# Patient Record
Sex: FEMALE | Race: WHITE | NOT HISPANIC OR LATINO | URBAN - METROPOLITAN AREA
[De-identification: names, ages, dates, MRNs, and addresses within clinical notes are randomized per-mention and may not be internally consistent; named-entity substitution may affect disease eponyms.]

---

## 2019-02-02 ENCOUNTER — EMERGENCY (EMERGENCY)
Facility: HOSPITAL | Age: 59
LOS: 1 days | Discharge: HOME | End: 2019-02-02
Attending: EMERGENCY MEDICINE
Payer: COMMERCIAL

## 2019-02-02 VITALS
TEMPERATURE: 98 F | DIASTOLIC BLOOD PRESSURE: 101 MMHG | HEIGHT: 60 IN | RESPIRATION RATE: 16 BRPM | OXYGEN SATURATION: 98 % | HEART RATE: 81 BPM | WEIGHT: 139.99 LBS | SYSTOLIC BLOOD PRESSURE: 165 MMHG

## 2019-02-02 VITALS
HEART RATE: 88 BPM | OXYGEN SATURATION: 99 % | SYSTOLIC BLOOD PRESSURE: 133 MMHG | DIASTOLIC BLOOD PRESSURE: 86 MMHG | TEMPERATURE: 98 F | RESPIRATION RATE: 18 BRPM

## 2019-02-02 LAB
ALBUMIN SERPL ELPH-MCNC: 4.4 G/DL — SIGNIFICANT CHANGE UP (ref 3.3–5)
ALP SERPL-CCNC: 87 U/L — SIGNIFICANT CHANGE UP (ref 40–120)
ALT FLD-CCNC: 18 U/L — SIGNIFICANT CHANGE UP (ref 10–45)
ANION GAP SERPL CALC-SCNC: 12 MMOL/L — SIGNIFICANT CHANGE UP (ref 5–17)
AST SERPL-CCNC: 15 U/L — SIGNIFICANT CHANGE UP (ref 10–40)
BILIRUB SERPL-MCNC: 0.2 MG/DL — SIGNIFICANT CHANGE UP (ref 0.2–1.2)
BUN SERPL-MCNC: 21 MG/DL — SIGNIFICANT CHANGE UP (ref 7–23)
CALCIUM SERPL-MCNC: 9.1 MG/DL — SIGNIFICANT CHANGE UP (ref 8.4–10.5)
CHLORIDE SERPL-SCNC: 103 MMOL/L — SIGNIFICANT CHANGE UP (ref 96–108)
CK SERPL-CCNC: 92 U/L — SIGNIFICANT CHANGE UP (ref 25–170)
CO2 SERPL-SCNC: 25 MMOL/L — SIGNIFICANT CHANGE UP (ref 22–31)
CREAT SERPL-MCNC: 0.74 MG/DL — SIGNIFICANT CHANGE UP (ref 0.5–1.3)
GLUCOSE SERPL-MCNC: 105 MG/DL — HIGH (ref 70–99)
MAGNESIUM SERPL-MCNC: 2.1 MG/DL — SIGNIFICANT CHANGE UP (ref 1.6–2.6)
POTASSIUM SERPL-MCNC: 4 MMOL/L — SIGNIFICANT CHANGE UP (ref 3.5–5.3)
POTASSIUM SERPL-SCNC: 4 MMOL/L — SIGNIFICANT CHANGE UP (ref 3.5–5.3)
PROT SERPL-MCNC: 7.7 G/DL — SIGNIFICANT CHANGE UP (ref 6–8.3)
SODIUM SERPL-SCNC: 140 MMOL/L — SIGNIFICANT CHANGE UP (ref 135–145)

## 2019-02-02 PROCEDURE — 80053 COMPREHEN METABOLIC PANEL: CPT

## 2019-02-02 PROCEDURE — 83735 ASSAY OF MAGNESIUM: CPT

## 2019-02-02 PROCEDURE — 99283 EMERGENCY DEPT VISIT LOW MDM: CPT

## 2019-02-02 PROCEDURE — 82550 ASSAY OF CK (CPK): CPT

## 2019-02-02 PROCEDURE — 99284 EMERGENCY DEPT VISIT MOD MDM: CPT

## 2019-02-02 RX ORDER — ACETAMINOPHEN 500 MG
975 TABLET ORAL ONCE
Qty: 0 | Refills: 0 | Status: COMPLETED | OUTPATIENT
Start: 2019-02-02 | End: 2019-02-02

## 2019-02-02 RX ORDER — IBUPROFEN 200 MG
400 TABLET ORAL ONCE
Qty: 0 | Refills: 0 | Status: COMPLETED | OUTPATIENT
Start: 2019-02-02 | End: 2019-02-02

## 2019-02-02 RX ADMIN — Medication 400 MILLIGRAM(S): at 16:37

## 2019-02-02 RX ADMIN — Medication 975 MILLIGRAM(S): at 16:35

## 2019-02-02 RX ADMIN — Medication 975 MILLIGRAM(S): at 16:37

## 2019-02-02 NOTE — ED PROVIDER NOTE - NSFOLLOWUPINSTRUCTIONS_ED_ALL_ED_FT
Keep continue your current medications.   Motrin (Advil or Ibuprofen) 400mg every 8hours for pain with food.  Tylenol 500mg or 650mg every 6hours for pain as needed.  Follow up with your Dr. for reevaluation in 2-3days, call Monday for an appointment.  Return for any concerns or worsening symptoms.

## 2019-02-02 NOTE — ED PROVIDER NOTE - OBJECTIVE STATEMENT
57yo female pt with PMHx of HTN, Chronic low back pain S/P Lumbar fusion (2016) c/o B/L thigh pain since 5-6days ago. Pt stated the pain's gradually worsen with ambulation. Denies injury or heavy lifting. Pt also reported the back pain is usually mild/ intermittent which improved with Tylenol but she took Tylenol for thigh pain without relief. Denies fever, chills or recent cough/ congestion. Denies headache, neck or back pain. Denies 59yo female pt with PMHx of HTN, Chronic low back pain S/P Lumbar fusion (2016) c/o B/L thigh pain since 5-6days ago. Pt stated the pain's gradually worsen with ambulation. Denies injury or heavy lifting. Pt also reported the back pain is usually mild/ intermittent which improved with Tylenol but she took Tylenol for thigh pain without relief. Denies fever, chills or recent cough/ congestion. Denies headache, neck or back pain. Denies sensory changes or weakness to extremities. Denies CP/SOB/ABD pain. Denies urinary or bowel problems. 59yo female pt with PMHx of HTN, Chronic low back pain S/P Lumbar fusion (2016) c/o B/L anterior-only thigh pain since 5-6days ago. The patient feels this is unrelated to her usual back pain. Thigh pain is crampy and acnhy, not burning. Pt stated the pain's gradually worsen with ambulation. Denies injury or heavy lifting. Pt also reported the back pain is usually mild/ intermittent which improved with Tylenol but she took Tylenol for thigh pain without relief. Denies fever, chills or recent cough/ congestion. Denies headache, neck or back pain. Denies sensory changes or weakness to extremities. Denies CP/SOB/ABD pain. Denies urinary or bowel problems. Last tylenol this AM.

## 2019-02-02 NOTE — ED PROVIDER NOTE - PHYSICAL EXAMINATION
NAD, Hypertensive, Afebrile. No spinal tenderness. No CVA or sciatica tender. ABD soft, non tender. No pelvic or hip tender. No obvious swelling, lesions or tender to B/L thigh. Full ROMs of knee without tenderness. No calf tender. N/V- intact. NAD, Hypertensive, Afebrile. No spinal tenderness. No CVA or sciatica tender. ABD soft, non tender. No pelvic or hip tender. No obvious swelling, lesions, warmth, or tender to B/L thigh. Full ROMs of knee without tenderness. No calf tender. N/V- intact.

## 2019-02-02 NOTE — ED ADULT NURSE NOTE - OBJECTIVE STATEMENT
57yo female amb to ED with past med h/o HTN, Chronic low back pain S/P Lumbar fusion (2016)  Pt c/o Bilateral thigh pain x5-6days ago.  Pain gradually worsens with ambulation. Denies injury or heavy lifting. Pt also reported the back pain is usually mild/ intermittent which improved with Tylenol but she took Tylenol for thigh pain without relief. Denies fever, chills or recent cough/ congestion. Denies chest pain neck or back pain. Denies sensory changes or weakness to extremities. Denies CP/SOB/ABD pain. Denies urinary or bowel problems.

## 2019-02-02 NOTE — ED ADULT TRIAGE NOTE - CHIEF COMPLAINT QUOTE
bilateral thigh and knee pain x5 days  reports minimal relief of pain after taking tylenol, last took dose yesterday  denies falls/trauma

## 2019-02-02 NOTE — ED PROVIDER NOTE - ATTENDING CONTRIBUTION TO CARE
ATTENDING MD: MARK, Alli Fernando, have seen and evaluated this patient with the advance practice clinician.  I have discussed the history, exam ,and aspects of care with the APC.  I have reviewed the APC's note. I agree with the findings  unless other wise stated in the HPI, PE, MDM, and progress notes.    GEN: well appearing, NAD, AOx4  HEAD: NCAT  EYES: clear  ENT: mucus membranes are moist, oropharynx is within normal limits  CV: normal rate, regular rhythm, 2+ DP and PT pulses  RESP: unlabored breathing  GI: abdomen sfot, notnender  MSK: no back tenderness, negative straight leg raise bilaterally. tenderness of quadriceps bilaterally locally  NEURO: 5/5 strength in all extremities, sensation intact to light touch throughout trunk and extremities, 2+ patellar reflexes bilaterally.    MDM: bilatearl  thigh pain, appars musculoskeltal, will check CK for myositis/overuse injury. tylenol/NSAIDs/ICE, reassess.    Charts made in real time. Please forgive typos.

## 2019-02-02 NOTE — ED ADULT NURSE NOTE - NSIMPLEMENTINTERV_GEN_ALL_ED
Implemented All Universal Safety Interventions:  [place] to call system. Call bell, personal items and telephone within reach. Instruct patient to call for assistance. Room bathroom lighting operational. Non-slip footwear when patient is off stretcher. Physically safe environment: no spills, clutter or unnecessary equipment. Stretcher in lowest position, wheels locked, appropriate side rails in place.

## 2019-07-01 ENCOUNTER — INPATIENT (INPATIENT)
Facility: HOSPITAL | Age: 59
LOS: 4 days | Discharge: HOME | DRG: 920 | End: 2019-07-06
Attending: INTERNAL MEDICINE | Admitting: INTERNAL MEDICINE
Payer: COMMERCIAL

## 2019-07-01 VITALS
HEART RATE: 96 BPM | WEIGHT: 130.07 LBS | RESPIRATION RATE: 18 BRPM | DIASTOLIC BLOOD PRESSURE: 73 MMHG | SYSTOLIC BLOOD PRESSURE: 131 MMHG | OXYGEN SATURATION: 97 % | HEIGHT: 60 IN

## 2019-07-01 PROCEDURE — 99285 EMERGENCY DEPT VISIT HI MDM: CPT

## 2019-07-01 NOTE — ED ADULT NURSE NOTE - ED STAT RN HANDOFF DETAILS
Bedside report given to on coming nurse Parris. Understands pmh, medications given and plan of care for patient. Patient in stable condition, vital signs updated, has no complaints at this time and has been updated on care plan. Explained to patient that it is change of shift and new nurse is taking over, pt verbalized understanding.

## 2019-07-01 NOTE — ED ADULT NURSE NOTE - NSIMPLEMENTINTERV_GEN_ALL_ED
Implemented All Fall Risk Interventions:  [place] to call system. Call bell, personal items and telephone within reach. Instruct patient to call for assistance. Room bathroom lighting operational. Non-slip footwear when patient is off stretcher. Physically safe environment: no spills, clutter or unnecessary equipment. Stretcher in lowest position, wheels locked, appropriate side rails in place. Provide visual cue, wrist band, yellow gown, etc. Monitor gait and stability. Monitor for mental status changes and reorient to person, place, and time. Review medications for side effects contributing to fall risk. Reinforce activity limits and safety measures with patient and family.

## 2019-07-01 NOTE — ED ADULT NURSE NOTE - PSH
Congenital Anomaly of Kidney  left kidney, removed at 5 year old  S/P  Section  3 c-sections  S/P Cholecystectomy  20 years ago

## 2019-07-01 NOTE — ED ADULT NURSE NOTE - PMH
History of Gestational Diabetes    History of Goiter  20 years ago, was on synthroid, resolved self  History of Pneumonia    HTN (Hypertension)    Lumbar Disc Disease

## 2019-07-01 NOTE — ED ADULT NURSE NOTE - OBJECTIVE STATEMENT
57 y/o female presenting to ED from [place] s/p recent diverticular perforation. Patient was hospitalized x 5 weeks in an [facility] where she was found to have a diverticular perforation. n [place] she proceeded to have an ex-lap and ileostomy placed.  Reports her ileostomy started to function last week, and has been tolerating PO. On arrival to ED current complaints of back pain from 18 hour flight. Pt denies headache, dizziness, chest pain, palpitations, cough, SOB,  n/v/d, urinary symptoms, fevers, chills, weakness at this time. A&Ox4 gross neuro intact, lungs cta bilaterally, no difficulty speaking in complete sentences, s1s2 heart sounds heard, pulses x 4, presley x4, abdomen soft nontender nondistended, present in LLQ is a colostomy with minimal outout, skin intact with exception of midline abdominal incision with staples clean dry and intact no wound openings, also present is a healing RLQ drain site. Safety and comfort measures maintained.

## 2019-07-02 DIAGNOSIS — I10 ESSENTIAL (PRIMARY) HYPERTENSION: ICD-10-CM

## 2019-07-02 DIAGNOSIS — M51.9 UNSPECIFIED THORACIC, THORACOLUMBAR AND LUMBOSACRAL INTERVERTEBRAL DISC DISORDER: ICD-10-CM

## 2019-07-02 DIAGNOSIS — Z93.2 ILEOSTOMY STATUS: ICD-10-CM

## 2019-07-02 LAB
ALBUMIN SERPL ELPH-MCNC: 3.5 G/DL — SIGNIFICANT CHANGE UP (ref 3.3–5)
ALBUMIN SERPL ELPH-MCNC: 3.8 G/DL — SIGNIFICANT CHANGE UP (ref 3.3–5)
ALP SERPL-CCNC: 91 U/L — SIGNIFICANT CHANGE UP (ref 40–120)
ALP SERPL-CCNC: 98 U/L — SIGNIFICANT CHANGE UP (ref 40–120)
ALT FLD-CCNC: 14 U/L — SIGNIFICANT CHANGE UP (ref 10–45)
ALT FLD-CCNC: 15 U/L — SIGNIFICANT CHANGE UP (ref 10–45)
ANION GAP SERPL CALC-SCNC: 13 MMOL/L — SIGNIFICANT CHANGE UP (ref 5–17)
ANION GAP SERPL CALC-SCNC: 14 MMOL/L — SIGNIFICANT CHANGE UP (ref 5–17)
APPEARANCE UR: CLEAR — SIGNIFICANT CHANGE UP
AST SERPL-CCNC: 17 U/L — SIGNIFICANT CHANGE UP (ref 10–40)
AST SERPL-CCNC: 18 U/L — SIGNIFICANT CHANGE UP (ref 10–40)
BASOPHILS # BLD AUTO: 0 K/UL — SIGNIFICANT CHANGE UP (ref 0–0.2)
BASOPHILS NFR BLD AUTO: 0.3 % — SIGNIFICANT CHANGE UP (ref 0–2)
BILIRUB SERPL-MCNC: 0.4 MG/DL — SIGNIFICANT CHANGE UP (ref 0.2–1.2)
BILIRUB SERPL-MCNC: 0.4 MG/DL — SIGNIFICANT CHANGE UP (ref 0.2–1.2)
BILIRUB UR-MCNC: NEGATIVE — SIGNIFICANT CHANGE UP
BUN SERPL-MCNC: 5 MG/DL — LOW (ref 7–23)
BUN SERPL-MCNC: 6 MG/DL — LOW (ref 7–23)
CALCIUM SERPL-MCNC: 9.3 MG/DL — SIGNIFICANT CHANGE UP (ref 8.4–10.5)
CALCIUM SERPL-MCNC: 9.5 MG/DL — SIGNIFICANT CHANGE UP (ref 8.4–10.5)
CHLORIDE SERPL-SCNC: 100 MMOL/L — SIGNIFICANT CHANGE UP (ref 96–108)
CHLORIDE SERPL-SCNC: 103 MMOL/L — SIGNIFICANT CHANGE UP (ref 96–108)
CO2 SERPL-SCNC: 21 MMOL/L — LOW (ref 22–31)
CO2 SERPL-SCNC: 21 MMOL/L — LOW (ref 22–31)
COLOR SPEC: YELLOW — SIGNIFICANT CHANGE UP
CREAT SERPL-MCNC: 0.35 MG/DL — LOW (ref 0.5–1.3)
CREAT SERPL-MCNC: <0.3 MG/DL — LOW (ref 0.5–1.3)
DIFF PNL FLD: NEGATIVE — SIGNIFICANT CHANGE UP
EOSINOPHIL # BLD AUTO: 0.2 K/UL — SIGNIFICANT CHANGE UP (ref 0–0.5)
EOSINOPHIL NFR BLD AUTO: 2.8 % — SIGNIFICANT CHANGE UP (ref 0–6)
GLUCOSE SERPL-MCNC: 103 MG/DL — HIGH (ref 70–99)
GLUCOSE SERPL-MCNC: 123 MG/DL — HIGH (ref 70–99)
GLUCOSE UR QL: NEGATIVE — SIGNIFICANT CHANGE UP
HCT VFR BLD CALC: 27.9 % — LOW (ref 34.5–45)
HGB BLD-MCNC: 9.6 G/DL — LOW (ref 11.5–15.5)
KETONES UR-MCNC: NEGATIVE — SIGNIFICANT CHANGE UP
LEUKOCYTE ESTERASE UR-ACNC: NEGATIVE — SIGNIFICANT CHANGE UP
LIDOCAIN IGE QN: 274 U/L — HIGH (ref 7–60)
LIDOCAIN IGE QN: 285 U/L — HIGH (ref 7–60)
LYMPHOCYTES # BLD AUTO: 1 K/UL — SIGNIFICANT CHANGE UP (ref 1–3.3)
LYMPHOCYTES # BLD AUTO: 11 % — LOW (ref 13–44)
MCHC RBC-ENTMCNC: 29.9 PG — SIGNIFICANT CHANGE UP (ref 27–34)
MCHC RBC-ENTMCNC: 34.6 GM/DL — SIGNIFICANT CHANGE UP (ref 32–36)
MCV RBC AUTO: 86.4 FL — SIGNIFICANT CHANGE UP (ref 80–100)
MONOCYTES # BLD AUTO: 1 K/UL — HIGH (ref 0–0.9)
MONOCYTES NFR BLD AUTO: 11.4 % — SIGNIFICANT CHANGE UP (ref 2–14)
NEUTROPHILS # BLD AUTO: 6.8 K/UL — SIGNIFICANT CHANGE UP (ref 1.8–7.4)
NEUTROPHILS NFR BLD AUTO: 74.5 % — SIGNIFICANT CHANGE UP (ref 43–77)
NITRITE UR-MCNC: NEGATIVE — SIGNIFICANT CHANGE UP
PH UR: 7 — SIGNIFICANT CHANGE UP (ref 5–8)
PLATELET # BLD AUTO: 312 K/UL — SIGNIFICANT CHANGE UP (ref 150–400)
POTASSIUM SERPL-MCNC: 3.4 MMOL/L — LOW (ref 3.5–5.3)
POTASSIUM SERPL-MCNC: 4.2 MMOL/L — SIGNIFICANT CHANGE UP (ref 3.5–5.3)
POTASSIUM SERPL-SCNC: 3.4 MMOL/L — LOW (ref 3.5–5.3)
POTASSIUM SERPL-SCNC: 4.2 MMOL/L — SIGNIFICANT CHANGE UP (ref 3.5–5.3)
PROT SERPL-MCNC: 6 G/DL — SIGNIFICANT CHANGE UP (ref 6–8.3)
PROT SERPL-MCNC: 6.7 G/DL — SIGNIFICANT CHANGE UP (ref 6–8.3)
PROT UR-MCNC: ABNORMAL
RBC # BLD: 3.23 M/UL — LOW (ref 3.8–5.2)
RBC # FLD: 13.3 % — SIGNIFICANT CHANGE UP (ref 10.3–14.5)
SODIUM SERPL-SCNC: 135 MMOL/L — SIGNIFICANT CHANGE UP (ref 135–145)
SODIUM SERPL-SCNC: 137 MMOL/L — SIGNIFICANT CHANGE UP (ref 135–145)
SP GR SPEC: 1.01 — SIGNIFICANT CHANGE UP (ref 1.01–1.02)
UROBILINOGEN FLD QL: NEGATIVE — SIGNIFICANT CHANGE UP
WBC # BLD: 9.1 K/UL — SIGNIFICANT CHANGE UP (ref 3.8–10.5)
WBC # FLD AUTO: 9.1 K/UL — SIGNIFICANT CHANGE UP (ref 3.8–10.5)

## 2019-07-02 PROCEDURE — 99255 IP/OBS CONSLTJ NEW/EST HI 80: CPT

## 2019-07-02 PROCEDURE — 71045 X-RAY EXAM CHEST 1 VIEW: CPT | Mod: 26

## 2019-07-02 PROCEDURE — 74177 CT ABD & PELVIS W/CONTRAST: CPT | Mod: 26

## 2019-07-02 RX ORDER — SODIUM CHLORIDE 9 MG/ML
10 INJECTION INTRAMUSCULAR; INTRAVENOUS; SUBCUTANEOUS
Refills: 0 | Status: DISCONTINUED | OUTPATIENT
Start: 2019-07-02 | End: 2019-07-06

## 2019-07-02 RX ORDER — MORPHINE SULFATE 50 MG/1
4 CAPSULE, EXTENDED RELEASE ORAL ONCE
Refills: 0 | Status: DISCONTINUED | OUTPATIENT
Start: 2019-07-02 | End: 2019-07-02

## 2019-07-02 RX ORDER — HYDROMORPHONE HYDROCHLORIDE 2 MG/ML
1 INJECTION INTRAMUSCULAR; INTRAVENOUS; SUBCUTANEOUS EVERY 6 HOURS
Refills: 0 | Status: DISCONTINUED | OUTPATIENT
Start: 2019-07-02 | End: 2019-07-05

## 2019-07-02 RX ORDER — OXYCODONE AND ACETAMINOPHEN 5; 325 MG/1; MG/1
1 TABLET ORAL ONCE
Refills: 0 | Status: DISCONTINUED | OUTPATIENT
Start: 2019-07-02 | End: 2019-07-02

## 2019-07-02 RX ORDER — OXYCODONE AND ACETAMINOPHEN 5; 325 MG/1; MG/1
1 TABLET ORAL EVERY 6 HOURS
Refills: 0 | Status: DISCONTINUED | OUTPATIENT
Start: 2019-07-02 | End: 2019-07-06

## 2019-07-02 RX ORDER — KETOROLAC TROMETHAMINE 30 MG/ML
15 SYRINGE (ML) INJECTION ONCE
Refills: 0 | Status: DISCONTINUED | OUTPATIENT
Start: 2019-07-02 | End: 2019-07-02

## 2019-07-02 RX ORDER — SODIUM CHLORIDE 9 MG/ML
1000 INJECTION, SOLUTION INTRAVENOUS ONCE
Refills: 0 | Status: COMPLETED | OUTPATIENT
Start: 2019-07-02 | End: 2019-07-02

## 2019-07-02 RX ORDER — LANOLIN ALCOHOL/MO/W.PET/CERES
5 CREAM (GRAM) TOPICAL AT BEDTIME
Refills: 0 | Status: DISCONTINUED | OUTPATIENT
Start: 2019-07-02 | End: 2019-07-06

## 2019-07-02 RX ORDER — POTASSIUM CHLORIDE 20 MEQ
40 PACKET (EA) ORAL ONCE
Refills: 0 | Status: COMPLETED | OUTPATIENT
Start: 2019-07-02 | End: 2019-07-02

## 2019-07-02 RX ORDER — CHLORHEXIDINE GLUCONATE 213 G/1000ML
1 SOLUTION TOPICAL
Refills: 0 | Status: DISCONTINUED | OUTPATIENT
Start: 2019-07-02 | End: 2019-07-06

## 2019-07-02 RX ORDER — BACLOFEN 100 %
10 POWDER (GRAM) MISCELLANEOUS
Refills: 0 | Status: DISCONTINUED | OUTPATIENT
Start: 2019-07-02 | End: 2019-07-06

## 2019-07-02 RX ORDER — HEPARIN SODIUM 5000 [USP'U]/ML
5000 INJECTION INTRAVENOUS; SUBCUTANEOUS EVERY 8 HOURS
Refills: 0 | Status: DISCONTINUED | OUTPATIENT
Start: 2019-07-02 | End: 2019-07-06

## 2019-07-02 RX ADMIN — MORPHINE SULFATE 4 MILLIGRAM(S): 50 CAPSULE, EXTENDED RELEASE ORAL at 15:52

## 2019-07-02 RX ADMIN — HYDROMORPHONE HYDROCHLORIDE 1 MILLIGRAM(S): 2 INJECTION INTRAMUSCULAR; INTRAVENOUS; SUBCUTANEOUS at 21:07

## 2019-07-02 RX ADMIN — Medication 5 MILLIGRAM(S): at 21:08

## 2019-07-02 RX ADMIN — Medication 15 MILLIGRAM(S): at 18:01

## 2019-07-02 RX ADMIN — Medication 40 MILLIEQUIVALENT(S): at 03:57

## 2019-07-02 RX ADMIN — HYDROMORPHONE HYDROCHLORIDE 1 MILLIGRAM(S): 2 INJECTION INTRAMUSCULAR; INTRAVENOUS; SUBCUTANEOUS at 21:37

## 2019-07-02 RX ADMIN — MORPHINE SULFATE 4 MILLIGRAM(S): 50 CAPSULE, EXTENDED RELEASE ORAL at 15:22

## 2019-07-02 RX ADMIN — MORPHINE SULFATE 4 MILLIGRAM(S): 50 CAPSULE, EXTENDED RELEASE ORAL at 04:13

## 2019-07-02 RX ADMIN — SODIUM CHLORIDE 500 MILLILITER(S): 9 INJECTION, SOLUTION INTRAVENOUS at 03:57

## 2019-07-02 RX ADMIN — Medication 15 MILLIGRAM(S): at 17:31

## 2019-07-02 RX ADMIN — OXYCODONE AND ACETAMINOPHEN 1 TABLET(S): 5; 325 TABLET ORAL at 12:19

## 2019-07-02 RX ADMIN — Medication 10 MILLIGRAM(S): at 21:07

## 2019-07-02 RX ADMIN — SODIUM CHLORIDE 1000 MILLILITER(S): 9 INJECTION, SOLUTION INTRAVENOUS at 05:37

## 2019-07-02 RX ADMIN — HEPARIN SODIUM 5000 UNIT(S): 5000 INJECTION INTRAVENOUS; SUBCUTANEOUS at 21:07

## 2019-07-02 NOTE — H&P ADULT - HISTORY OF PRESENT ILLNESS
Ms. Fuller is a 58 year old female with gestational diabetes, lumbar disc disease s/p hardware placement 5011, left nephrectomy when she was 6 yo for congenital anomaly, HTN, cholecystectomy, transfered from an [facility] for diverticular perforation. She states that she was [place] an [place] at the end of [place] when she began to have abdominal distention and severe pain. [facility] where she was found to have perforated diverticulitis with multiple perforations of the L colon, for which she underwent an exploratory laparotomy, L colectomy and Hartmanns on 5/30/19. Her hospital course was complicated by obstruction for which she underwent return to OR for adhesiolysis on 6/19/19. Refeeding started 6/23/19, abdominal drains removed 6/24/19, and bladder catheter removed 6/25/19. No culture data available. Patient was on Meropenem, vancomycin, metronidazole and capsofungin.    She presents today because she has been unhappy with the care she was receiving in [place]. She would like to find a surgeon to follow up with. She does not have any acute issues and would like to go home today. She is tolerating a diet and having ostomy function. She currently denies abdominal pain. Abdominal wound remains with staples, has some drainage near the inferior portion of the wound. No fevers, no chills.  Seen by Surgery and ID in the ER

## 2019-07-02 NOTE — H&P ADULT - NSICDXPASTMEDICALHX_GEN_ALL_CORE_FT
PAST MEDICAL HISTORY:  History of Gestational Diabetes     History of Goiter 20 years ago, was on synthroid, resolved self    History of Pneumonia     HTN (Hypertension)     Lumbar Disc Disease

## 2019-07-02 NOTE — ED PROVIDER NOTE - ATTENDING CONTRIBUTION TO CARE
Attending MD Darling Mae:  I personally have seen and examined this patient.  Resident note reviewed and agree on plan of care and except where noted.  See HPI, PE, and MDM for details.

## 2019-07-02 NOTE — CONSULT NOTE ADULT - ATTENDING COMMENTS
I have seen and evaluated patient and discussed with team.  Chart and data reviewed.  Because complete records not available and because had perforated diverticulitis will get CT prior to determining if discharge appropriate.  Need to make sure no undrained collections which would result in readmission
Sandeep Junior MD  Pager [phone]  After 5pm/weekends call [phone]

## 2019-07-02 NOTE — ED ADULT NURSE REASSESSMENT NOTE - NS ED NURSE REASSESS COMMENT FT1
Patient a&ox3, anxious, resp nonlabored, resting comfortably in bed with family at bedside.  C/o feeling depressed about her current situation, and how she was treated in the [facility] hospital. At this time denying SI/HI, "I don't want to hurt myself or anyone else, I just want to talk to someone bout what happened to me." RN utilizing deescalation techniques, and non pharmacologic calming therapy (quiet dark environment, using calm voice when speaking).  Also complaining of pain at this time, and states "I am not allergic to morphine, I had it many times in [place], and had no reaction." Patient repositioned, warm blankets and pillows provided. MD Conteh at bedside speaking with patient. Denies headache, dizziness, chest pain, palpitations, SOB, n/v/d, urinary symptoms, fevers, chills, weakness at this time. Patient awaiting to be seen and evaluated by surgery. Safety maintained.

## 2019-07-02 NOTE — ED ADULT NURSE REASSESSMENT NOTE - NS ED NURSE REASSESS COMMENT FT1
Pt requesting to be discharged at this time. Pending wound care consultation and MD Mae recommends pt is admitted to hospital, pt has spoken to MD Mae twice at this time. Pt using phone at this time.

## 2019-07-02 NOTE — ED PROVIDER NOTE - PROGRESS NOTE DETAILS
change of care from danish kenney to reny kenney. pt w fluid collections, review of paperwork from [place] showing pt was on antifungal and 2 abx on leaving and still has picc in place. will admit for further w/u. surg states no or right now. id at bedside

## 2019-07-02 NOTE — CHART NOTE - NSCHARTNOTEFT_GEN_A_CORE
Patient underwent CT scan, no emergent findings, no role for urgent surgical intervention.    Plan for visit with enterostomal therapists for new colostomy.  Will required social work input for new colostomy.  Maintain surgical staples at this time, plan for removal in office.  Please see Dr. Navarro in the office in 1 week, call to schedule an appointment.    Discussed with Dr. Navarro  --ZO Moreno, PGY-5

## 2019-07-02 NOTE — CONSULT NOTE ADULT - ASSESSMENT
58F s/p exploratory laparotomy, L colectomy and hartmanns on 5/30/19. Her hospital course was complicated by obstruction for which she underwent return to OR for adhesiolysis on 6/19/19. She presents today from [place] to set up follow up care for herself    - No acute surgical intervention. The patient can follow up as an outpatient with one of the three colorectal surgeons in the [facility]. Please call [phone] to make an appointment  - Recommend Gyn consult to assess vulvar mass  - D/w Dr. Zaman    1415
58 year old female with gestational diabetes, lumbar disc disease s/p hardware placement 1133, left nephrectomy when she was 4 yo for congenital anomaly, HTN, cholecystectomy, [facility].     s/p exploratory laparotomy, L colectomy and Hartmanns on 5/30/19.   Complicated by obstruction RTOR for adhesiolysis on 6/19/19  Abdominal drains removed 6/24/19  No culture data available  Patient was on Meropenem, vancomycin, metronidazole and capsofungin in [place]  Now afebrile  WBC WNL  Reviewed chart from [place].  Reviewed CT A/P with radiology team and no abscess collections, has fluid collections and suspect post op fluid collections.    Recommend:  -Patient has been on a prolonged course of abx in [place], at this time she has no systemic signs of infection.  -Would monitor off abx at this time  -F/U blood cultures  -Monitor for fevers  -Trend WBC    Will follow along with you.  -

## 2019-07-02 NOTE — ED ADULT NURSE REASSESSMENT NOTE - NS ED NURSE REASSESS COMMENT FT1
MD Navarro at bedside for evaluation, awaiting CT results for dispo. Pt also requesting pain medications, MD Mae aware.

## 2019-07-02 NOTE — ED ADULT NURSE REASSESSMENT NOTE - NS ED NURSE REASSESS COMMENT FT1
Pt requesting medication for pain and sleep, MD Mae aware. MD Mae reaching out to MD Navarro for plan of care at this time. Pt requesting medication for pain and sleep, MD Mae aware. MD Mae reaching out to MD Navarro for plan of care at this time. Pt sleeping at this time in stretcher, unlabored, spontaneous respirations, NAD.

## 2019-07-02 NOTE — ED PROVIDER NOTE - NS ED ROS FT
General: denies fever, chills  HENT: denies nasal congestion, rhinorrhea  Eyes: denies visual changes, blurred vision  CV: denies chest pain, palpitations  Resp: denies difficulty breathing, cough  Abdominal: denies nausea, vomiting, abdominal pain  MSK: denies muscle aches, + back pain  Neuro: denies headaches, numbness, tingling  Skin: denies rashes, bruises

## 2019-07-02 NOTE — ED PROVIDER NOTE - CLINICAL SUMMARY MEDICAL DECISION MAKING FREE TEXT BOX
58F w/ recent surgical abdomen, ileostomy in place, functioning well, abdomen soft, nontender, received her care at a hospital in [place], surgery cs, likely follow up with surgery outpatient as she has no tenderness and her ostomy appears to be working well 58F w/ recent surgical abdomen, ileostomy in place, functioning well, abdomen soft, nontender, received her care at a hospital in [place], surgery cs, likely follow up with surgery outpatient as she has no tenderness and her ostomy appears to be working well  Attending Darling Mae: 59 y/o female s/p recent exteded hospital admission for diverticulitis with ostomy palcement with picc in left arm presenting after just returning back to [place]. upon arrival abd mildly tender. surgery called upon arrival as aware of pt coming to hospital.  blood work sent. pt with diffuse ttp, unclear whether post surgical pain vs possible complication. labs showed elvated lipase. pt does have mild back pain. given IVF. pt will need home health care help and ostomy guidance. will d/w surgery, shirley admit for further monitorint

## 2019-07-02 NOTE — ED PROVIDER NOTE - OBJECTIVE STATEMENT
58F w/ recent diverticular perforation in [place], recent hospital admission, ex-lap, ileostomy placed presents after being flown in. States her ileostomy started to function last week. States she has been tolerating PO, has had ileostomy output. Denies fevers, chills, n/v recently. Denies abdominal pain at this time. States she has chronic back pain that has been exacerbated by her flight.

## 2019-07-02 NOTE — CONSULT NOTE ADULT - SUBJECTIVE AND OBJECTIVE BOX
HPI:  Ms. Fuller is a 58 year old female with gestational diabetes, lumbar disc disease s/p hardware placement 5011, left nephrectomy when she was 4 yo for congenital anomaly, HTN, cholecystectomy, transfered from an [facility] for diverticular perforation. She states that she was on an [place] cruise at the end of May when she began to have abdominal distention and severe pain. She was taken to an [facility] where she was found to have perforated diverticulitis with multiple perforations of the L colon, for which she underwent an exploratory laparotomy, L colectomy and Hartmanns on 19. Her hospital course was complicated by obstruction for which she underwent return to OR for adhesiolysis on 19. Refeeding started 19, abdominal drains removed 19, and bladder catheter removed 19. No culture data available. Patient was on Meropenem, vancomycin, metronidazole and capsofungin.    She presents today because she has been unhappy with the care she was receiving in [place]. She would like to find a surgeon to follow up with. She does not have any acute issues and would like to go home today. She is tolerating a diet and having ostomy function. She currently denies abdominal pain. Abdominal wound remains with staples, has some drainage near the inferior portion of the wound. No fevers, no chills.    PAST MEDICAL & SURGICAL HISTORY:  Lumbar Disc Disease  History of Gestational Diabetes  History of Pneumonia  History of Goiter: 20 years ago, was on synthroid, resolved self  HTN (Hypertension)  S/P Cholecystectomy: 20 years ago  Congenital Anomaly of Kidney: left kidney, removed at 5 year old  S/P  Section: 3 c-sections    Allergies    No Known Allergies    Intolerances    ANTIMICROBIALS:      OTHER MEDS:      SOCIAL HISTORY: Former smoker, quit 2 months ago.     FAMILY HISTORY:  Mother with heart disease    Drug Dosing Weight  Height (cm): 152.4 (2019 22:34)  Weight (kg): 59 (2019 22:34)  BMI (kg/m2): 25.4 (2019 22:34)  BSA (m2): 1.55 (2019 22:34)    PE:    Vital Signs Last 24 Hrs  T(C): 37 (2019 11:39), Max: 37 (2019 11:39)  T(F): 98.6 (2019 11:39), Max: 98.6 (2019 11:39)  HR: 94 (2019 11:39) (86 - 96)  BP: 148/100 (2019 11:39) (131/73 - 149/91)  BP(mean): --  RR: 20 (2019 11:39) (17 - 20)  SpO2: 98% (2019 11:39) (97% - 100%)    Gen: AOx3, NAD, non-toxic, pleasant  CV: S1+S2 normal, no murmurs  Resp: Clear bilat, no resp distress  Abd: Soft, nontender, +ostomy, wound closed with staples in place, intact, drainage on gauze  Ext: No LE edema, no wounds  : No Wen  IV/Skin: No thrombophlebitis, left arm picc line intact  Msk: No low back pain, no arthralgias, no joint swelling  Neuro: No sensory deficits, no motor deficits    LABS:                          9.6    9.1   )-----------( 312      ( 2019 00:39 )             27.9       07-02    137  |  103  |  5<L>  ----------------------------<  103<H>  4.2   |  21<L>  |  <0.30<L>    Ca    9.5      2019 05:46    TPro  6.0  /  Alb  3.5  /  TBili  0.4  /  DBili  x   /  AST  18  /  ALT  14  /  AlkPhos  91  07-02      Urinalysis Basic - ( 2019 23:54 )    Color: Yellow / Appearance: Clear / S.010 / pH: x  Gluc: x / Ketone: Negative  / Bili: Negative / Urobili: Negative   Blood: x / Protein: 30 mg/dL / Nitrite: Negative   Leuk Esterase: Negative / RBC: 1 /hpf / WBC 2 /HPF   Sq Epi: x / Non Sq Epi: 2 /hpf / Bacteria: Negative    MICROBIOLOGY:  v    RADIOLOGY:    < from: CT Abdomen and Pelvis w/ Oral Cont and w/ IV Cont (19 @ 08:56) >  IMPRESSION:     Status post Shepherd's procedure.    A few small foci of loculated fluid as above. No drainable collection.      < end of copied text >    < from: Xray Chest 1 View- PORTABLE-Urgent (19 @ 00:18) >  IMPRESSION:   Left-sided PICC line terminates in the SVC.    < end of copied text >
General Surgery Consult  Consulting surgical team: [facility]  Consulting attending: Austyn    HPI: 58F presents for establishment of followup care after undergoing Hartmanns for perforated diverticulitis in [place].    Per the patient's history and transfer documents, she was on an [place] at the end of May when she began to have abdominal distention and severe pain. She was [facility] to an [place] [facility] where she was found to have perforated diverticulitis with multiple perforations of the L colon, for which she underwent an exploratory laparotomy, L colectomy and hartmanns on 19. Her hospital course was complicated by obstruction for which she underwent return to OR for adhesiolysis on 19.    She presents today because she has been unhappy with the care she was receiving in [place]. She would like to find a surgeon to follow up with. She does not have any acute issues and would like to go home today. She is tolerating a diet and having ostomy function. She currently denies abdominal pain.    She also complains of a vulvar abscess.    PAST MEDICAL HISTORY:  S/P  Section  Lumbar Disc Disease  History of Gestational Diabetes  History of Pneumonia  History of Goiter  HTN (Hypertension)      PAST SURGICAL HISTORY:  S/P Cholecystectomy  Congenital Anomaly of Kidney  S/P  Section  S/p exploratory laparotomy, L colectomy and Shepherd's  s/p exploratory laparotomy and lysis of adhesions      VITALS & I/Os:  Vital Signs Last 24 Hrs  T(C): 36.7 (2019 03:55), Max: 36.7 (2019 03:55)  T(F): 98.1 (2019 03:55), Max: 98.1 (2019 03:55)  HR: 87 (2019 03:55) (87 - 96)  BP: 143/89 (2019 03:55) (131/73 - 143/89)  BP(mean): --  RR: 17 (2019 03:55) (17 - 18)  SpO2: 100% (2019 03:55) (97% - 100%)    I&O's Summary      PHYSICAL EXAM:  General: No acute distress  Respiratory: Nonlabored  Cardiovascular: RRR  Abdominal: Soft, nondistended, nontender. Ostomy with stool and gas in bag  Extremities: Warm    LABS:                        9.6    9.1   )-----------( 312      ( 2019 00:39 )             27.9     07-02    135  |  100  |  6<L>  ----------------------------<  123<H>  3.4<L>   |  21<L>  |  0.35<L>    Ca    9.3      2019 00:39    TPro  6.7  /  Alb  3.8  /  TBili  0.4  /  DBili  x   /  AST  17  /  ALT  15  /  AlkPhos  98  07-02    Lactate:      Urinalysis Basic - ( 2019 23:54 )    Color: Yellow / Appearance: Clear / S.010 / pH: x  Gluc: x / Ketone: Negative  / Bili: Negative / Urobili: Negative   Blood: x / Protein: 30 mg/dL / Nitrite: Negative   Leuk Esterase: Negative / RBC: 1 /hpf / WBC 2 /HPF   Sq Epi: x / Non Sq Epi: 2 /hpf / Bacteria: Negative        IMAGING:

## 2019-07-02 NOTE — ED ADULT NURSE REASSESSMENT NOTE - NS ED NURSE REASSESS COMMENT FT1
Patient able to ambulate with assistance. Vitals stable. Patient remains anxious, and depressed at this time. Continuing to deny SI/HI. MD Mae aware. Safety and comfort measures maintained.

## 2019-07-02 NOTE — ED ADULT NURSE REASSESSMENT NOTE - NS ED NURSE REASSESS COMMENT FT1
Report received from Evette SERRANO. AOx3, speaking in complete sentences. Abdomen soft, non-tender, non-distended, staples in midline of abdomen, ostomy site covered, no contents in bag at this time. Awaiting CT scan, pt aware of plan of care at this time.

## 2019-07-02 NOTE — H&P ADULT - NSICDXPASTSURGICALHX_GEN_ALL_CORE_FT
PAST SURGICAL HISTORY:  Congenital Anomaly of Kidney left kidney, removed at 5 year old    S/P  Section 3 c-sections    S/P Cholecystectomy 20 years ago

## 2019-07-02 NOTE — ED PROVIDER NOTE - PHYSICAL EXAMINATION
CONSTITUTIONAL: NAD, awake, alert  HEAD: Normocephalic; atraumatic  CARD: Normal Sl, S2; no audible murmurs  RESP: normal wob, lungs ctab  ABD: soft, non-distended; non-tender, ileostomy in LLQ in place, pink, viable, bag w/ output   EXT: no edema, normal ROM in all four extremities  SKIN: Warm, dry, no rashes  NEURO: aaox3, moving all extremities spontaneously CONSTITUTIONAL: NAD, awake, alert  HEAD: Normocephalic; atraumatic  CARD: Normal Sl, S2; no audible murmurs  RESP: normal wob, lungs ctab  ABD: soft, non-distended; non-tender, ileostomy in LLQ in place, pink, viable, bag w/ output   EXT: no edema, normal ROM in all four extremities  SKIN: Warm, dry, no rashes  NEURO: aaox3, moving all extremities spontaneously  Attending Darling Mae: Gen: NAD, heent: atrauamtic, eomi, perrla, mmm, op pink, uvula midline, neck; nttp, no nuchal rigidity, chest: nttp, no crepitus, cv: rrr, no murmurs, lungs: ctab, abd: soft, mildly tender diffusely, surgical scars c/d/i. ostomy pink, no peritoneal signs,, ext: wwp, neg homans, skin: no rash, neuro: awake and alert, following commands, speech clear, sensation and strength intact, no focal deficits psych tearful

## 2019-07-03 ENCOUNTER — TRANSCRIPTION ENCOUNTER (OUTPATIENT)
Age: 59
End: 2019-07-03

## 2019-07-03 LAB
ALBUMIN SERPL ELPH-MCNC: 3.5 G/DL — SIGNIFICANT CHANGE UP (ref 3.3–5)
ALP SERPL-CCNC: 89 U/L — SIGNIFICANT CHANGE UP (ref 40–120)
ALT FLD-CCNC: 14 U/L — SIGNIFICANT CHANGE UP (ref 10–45)
ANION GAP SERPL CALC-SCNC: 13 MMOL/L — SIGNIFICANT CHANGE UP (ref 5–17)
AST SERPL-CCNC: 18 U/L — SIGNIFICANT CHANGE UP (ref 10–40)
BASOPHILS # BLD AUTO: 0 K/UL — SIGNIFICANT CHANGE UP (ref 0–0.2)
BASOPHILS NFR BLD AUTO: 0.6 % — SIGNIFICANT CHANGE UP (ref 0–2)
BILIRUB SERPL-MCNC: 0.3 MG/DL — SIGNIFICANT CHANGE UP (ref 0.2–1.2)
BUN SERPL-MCNC: 5 MG/DL — LOW (ref 7–23)
CALCIUM SERPL-MCNC: 9.3 MG/DL — SIGNIFICANT CHANGE UP (ref 8.4–10.5)
CHLORIDE SERPL-SCNC: 102 MMOL/L — SIGNIFICANT CHANGE UP (ref 96–108)
CO2 SERPL-SCNC: 22 MMOL/L — SIGNIFICANT CHANGE UP (ref 22–31)
CREAT SERPL-MCNC: 0.33 MG/DL — LOW (ref 0.5–1.3)
EOSINOPHIL # BLD AUTO: 0.3 K/UL — SIGNIFICANT CHANGE UP (ref 0–0.5)
EOSINOPHIL NFR BLD AUTO: 4.1 % — SIGNIFICANT CHANGE UP (ref 0–6)
GLUCOSE SERPL-MCNC: 110 MG/DL — HIGH (ref 70–99)
HCT VFR BLD CALC: 26.2 % — LOW (ref 34.5–45)
HCV AB S/CO SERPL IA: 0.18 S/CO — SIGNIFICANT CHANGE UP (ref 0–0.99)
HCV AB SERPL-IMP: SIGNIFICANT CHANGE UP
HGB BLD-MCNC: 8.8 G/DL — LOW (ref 11.5–15.5)
LYMPHOCYTES # BLD AUTO: 1.1 K/UL — SIGNIFICANT CHANGE UP (ref 1–3.3)
LYMPHOCYTES # BLD AUTO: 16 % — SIGNIFICANT CHANGE UP (ref 13–44)
MCHC RBC-ENTMCNC: 29.6 PG — SIGNIFICANT CHANGE UP (ref 27–34)
MCHC RBC-ENTMCNC: 33.7 GM/DL — SIGNIFICANT CHANGE UP (ref 32–36)
MCV RBC AUTO: 87.7 FL — SIGNIFICANT CHANGE UP (ref 80–100)
MONOCYTES # BLD AUTO: 0.7 K/UL — SIGNIFICANT CHANGE UP (ref 0–0.9)
MONOCYTES NFR BLD AUTO: 9.7 % — SIGNIFICANT CHANGE UP (ref 2–14)
NEUTROPHILS # BLD AUTO: 4.8 K/UL — SIGNIFICANT CHANGE UP (ref 1.8–7.4)
NEUTROPHILS NFR BLD AUTO: 69.7 % — SIGNIFICANT CHANGE UP (ref 43–77)
PLATELET # BLD AUTO: 316 K/UL — SIGNIFICANT CHANGE UP (ref 150–400)
POTASSIUM SERPL-MCNC: 3.7 MMOL/L — SIGNIFICANT CHANGE UP (ref 3.5–5.3)
POTASSIUM SERPL-SCNC: 3.7 MMOL/L — SIGNIFICANT CHANGE UP (ref 3.5–5.3)
PROT SERPL-MCNC: 6.3 G/DL — SIGNIFICANT CHANGE UP (ref 6–8.3)
RBC # BLD: 2.99 M/UL — LOW (ref 3.8–5.2)
RBC # FLD: 13.5 % — SIGNIFICANT CHANGE UP (ref 10.3–14.5)
SODIUM SERPL-SCNC: 137 MMOL/L — SIGNIFICANT CHANGE UP (ref 135–145)
WBC # BLD: 7 K/UL — SIGNIFICANT CHANGE UP (ref 3.8–10.5)
WBC # FLD AUTO: 7 K/UL — SIGNIFICANT CHANGE UP (ref 3.8–10.5)

## 2019-07-03 PROCEDURE — 99232 SBSQ HOSP IP/OBS MODERATE 35: CPT

## 2019-07-03 RX ADMIN — OXYCODONE AND ACETAMINOPHEN 1 TABLET(S): 5; 325 TABLET ORAL at 15:35

## 2019-07-03 RX ADMIN — HYDROMORPHONE HYDROCHLORIDE 1 MILLIGRAM(S): 2 INJECTION INTRAMUSCULAR; INTRAVENOUS; SUBCUTANEOUS at 03:37

## 2019-07-03 RX ADMIN — OXYCODONE AND ACETAMINOPHEN 1 TABLET(S): 5; 325 TABLET ORAL at 10:06

## 2019-07-03 RX ADMIN — HYDROMORPHONE HYDROCHLORIDE 1 MILLIGRAM(S): 2 INJECTION INTRAMUSCULAR; INTRAVENOUS; SUBCUTANEOUS at 03:07

## 2019-07-03 RX ADMIN — Medication 5 MILLIGRAM(S): at 22:05

## 2019-07-03 RX ADMIN — HEPARIN SODIUM 5000 UNIT(S): 5000 INJECTION INTRAVENOUS; SUBCUTANEOUS at 22:05

## 2019-07-03 RX ADMIN — OXYCODONE AND ACETAMINOPHEN 1 TABLET(S): 5; 325 TABLET ORAL at 15:05

## 2019-07-03 RX ADMIN — HEPARIN SODIUM 5000 UNIT(S): 5000 INJECTION INTRAVENOUS; SUBCUTANEOUS at 15:05

## 2019-07-03 RX ADMIN — CHLORHEXIDINE GLUCONATE 1 APPLICATION(S): 213 SOLUTION TOPICAL at 12:09

## 2019-07-03 RX ADMIN — HYDROMORPHONE HYDROCHLORIDE 1 MILLIGRAM(S): 2 INJECTION INTRAMUSCULAR; INTRAVENOUS; SUBCUTANEOUS at 11:31

## 2019-07-03 RX ADMIN — HYDROMORPHONE HYDROCHLORIDE 1 MILLIGRAM(S): 2 INJECTION INTRAMUSCULAR; INTRAVENOUS; SUBCUTANEOUS at 20:21

## 2019-07-03 RX ADMIN — HYDROMORPHONE HYDROCHLORIDE 1 MILLIGRAM(S): 2 INJECTION INTRAMUSCULAR; INTRAVENOUS; SUBCUTANEOUS at 12:01

## 2019-07-03 RX ADMIN — Medication 10 MILLIGRAM(S): at 17:05

## 2019-07-03 RX ADMIN — OXYCODONE AND ACETAMINOPHEN 1 TABLET(S): 5; 325 TABLET ORAL at 09:36

## 2019-07-03 RX ADMIN — Medication 10 MILLIGRAM(S): at 05:19

## 2019-07-03 RX ADMIN — HYDROMORPHONE HYDROCHLORIDE 1 MILLIGRAM(S): 2 INJECTION INTRAMUSCULAR; INTRAVENOUS; SUBCUTANEOUS at 20:51

## 2019-07-03 RX ADMIN — HEPARIN SODIUM 5000 UNIT(S): 5000 INJECTION INTRAVENOUS; SUBCUTANEOUS at 05:19

## 2019-07-03 NOTE — PROGRESS NOTE ADULT - ATTENDING COMMENTS
Sandeep Junior MD  Pager [phone]  After 5pm/weekends call [phone]
I have seen and evaluated patient. Patient will followup with me in my office. Will remove every other staple today. Remaining staples can be removed in the next few days.    Lanre Navarro MD  [phone]

## 2019-07-03 NOTE — PROGRESS NOTE ADULT - SUBJECTIVE AND OBJECTIVE BOX
Surgery Progress Note    S: Patient seen and examined. No acute events overnight. Pain well controlled. tolerating diet, denies nausea or vomiting. ostomy functioning.     O:  Vital Signs Last 24 Hrs  T(C): 36.6 (03 Jul 2019 05:14), Max: 37 (02 Jul 2019 11:39)  T(F): 97.9 (03 Jul 2019 05:14), Max: 98.6 (02 Jul 2019 11:39)  HR: 76 (03 Jul 2019 05:14) (76 - 94)  BP: 138/79 (03 Jul 2019 05:14) (102/66 - 149/91)  BP(mean): --  RR: 18 (03 Jul 2019 05:14) (17 - 20)  SpO2: 99% (03 Jul 2019 05:14) (97% - 99%)    I&O's Detail    02 Jul 2019 07:01  -  03 Jul 2019 06:46  --------------------------------------------------------  IN:    Oral Fluid: 240 mL  Total IN: 240 mL    OUT:    Colostomy: 450 mL    Voided: 550 mL  Total OUT: 1000 mL    Total NET: -760 mL          MEDICATIONS  (STANDING):  baclofen 10 milliGRAM(s) Oral two times a day  chlorhexidine 4% Liquid 1 Application(s) Topical <User Schedule>  heparin  Injectable 5000 Unit(s) SubCutaneous every 8 hours  melatonin 5 milliGRAM(s) Oral at bedtime    MEDICATIONS  (PRN):  HYDROmorphone  Injectable 1 milliGRAM(s) IV Push every 6 hours PRN Severe Pain (7 - 10)  oxyCODONE    5 mG/acetaminophen 325 mG 1 Tablet(s) Oral every 6 hours PRN Moderate Pain (4 - 6)  sodium chloride 0.9% lock flush 10 milliLiter(s) IV Push every 1 hour PRN Pre/post blood products, medications, blood draw, and to maintain line patency                            9.6    9.1   )-----------( 312      ( 02 Jul 2019 00:39 )             27.9       07-02    137  |  103  |  5<L>  ----------------------------<  103<H>  4.2   |  21<L>  |  <0.30<L>    Ca    9.5      02 Jul 2019 05:46    TPro  6.0  /  Alb  3.5  /  TBili  0.4  /  DBili  x   /  AST  18  /  ALT  14  /  AlkPhos  91  07-02      PHYSICAL EXAM:  General: No acute distress  Respiratory: Nonlabored  Cardiovascular: RRR  Abdominal: Soft, nondistended, nontender. Midline incision c/d/i Ostomy with stool and gas in bag  Extremities: Warm

## 2019-07-03 NOTE — PROGRESS NOTE ADULT - PROBLEM SELECTOR PLAN 1
from perf diverticulitis. Also had drainage of vulvar abscess. FU repeat cultures  Anemia: FU Iron studies

## 2019-07-03 NOTE — PHYSICAL THERAPY INITIAL EVALUATION ADULT - PERTINENT HX OF CURRENT PROBLEM, REHAB EVAL
58F vacationing on a cruise began having abdominal pain. pt was medically evacuated to a hospital in [place] and was treated for perforated bowel.  Pt is s/p exploratory laparotomy, L colectomy and hartmanns on 5/30/19. Her hospital course in [place] was complicated by obstruction for which she underwent return to OR for adhesiolysis on 6/19/19. She presents from [place] to set up follow up care for herself

## 2019-07-03 NOTE — DISCHARGE NOTE PROVIDER - CARE PROVIDER_API CALL
Lanre Navarro)  ColonRectal Surgery; Surgery  [address], [address]  [place] [address]  Phone: [phone]  Fax: [phone]  Follow Up Time: 1 week

## 2019-07-03 NOTE — PROGRESS NOTE ADULT - ASSESSMENT
58F s/p exploratory laparotomy, L colectomy and hartmanns on 5/30/19. Her hospital course was complicated by obstruction for which she underwent return to OR for adhesiolysis on 6/19/19. She presents today from [place] to set up follow up care for herself    - No acute surgical intervention. The patient can follow up as an outpatient with one of the three colorectal surgeons in the [facility]. Please call [phone] to make an appointment  - Recommend Gyn consult to assess vulvar mass      [id number] s/p exploratory laparotomy, L colectomy and hartmanns on 5/30/19. Her hospital course was complicated by obstruction for which she underwent return to OR for adhesiolysis on 6/19/19. She presents today from [place] to set up follow up care for herself    - No acute surgical intervention. The patient can follow up as an outpatient with Dr. Navarro. Please call [phone] to make an appointment  - Recommend Gyn consult to assess vulvar mass      [phone]

## 2019-07-03 NOTE — DISCHARGE NOTE NURSING/CASE MANAGEMENT/SOCIAL WORK - NSDCDPATPORTLINK_GEN_ALL_CORE
You can access the [facility] Patient Portal, offered by [facility], by registering with the following website: [email or web address]

## 2019-07-03 NOTE — DISCHARGE NOTE PROVIDER - NSDCCPCAREPLAN_GEN_ALL_CORE_FT
PRINCIPAL DISCHARGE DIAGNOSIS  Diagnosis: Ileostomy in place  Assessment and Plan of Treatment: CT scan with no any abscess collection. Seen by surgical team and ID  No indication for intervention. Needs to F/u with surgical team      SECONDARY DISCHARGE DIAGNOSES  Diagnosis: Benign essential hypertension  Assessment and Plan of Treatment: continue current medications

## 2019-07-03 NOTE — PROGRESS NOTE ADULT - SUBJECTIVE AND OBJECTIVE BOX
Patient is a 58y old  Female who presents with a chief complaint of     SUBJECTIVE / OVERNIGHT EVENTS:  No new symptoms  Afebrile  Review of Systems:   CONSTITUTIONAL: No fever, weight loss, +fatigue  EYES: No eye pain, visual disturbances, or discharge  ENMT:  No difficulty hearing, tinnitus, vertigo; No sinus or throat pain  NECK: No pain or stiffness  BREASTS: No pain, masses, or nipple discharge  RESPIRATORY: No cough, wheezing, chills or hemoptysis; No shortness of breath  CARDIOVASCULAR: No chest pain, palpitations, dizziness, or leg swelling  GASTROINTESTINAL: No abdominal or epigastric pain. No nausea, vomiting, or hematemesis; No diarrhea or constipation. No melena or hematochezia.  GENITOURINARY: No dysuria, frequency, hematuria, or incontinence  NEUROLOGICAL: No headaches, memory loss, loss of strength, numbness, or tremors  SKIN: No itching, burning, rashes, or lesions   LYMPH NODES: No enlarged glands  ENDOCRINE: No heat or cold intolerance; No hair loss  MUSCULOSKELETAL: No joint pain or swelling; No muscle, back, or extremity pain  PSYCHIATRIC: No depression, anxiety, mood swings, or difficulty sleeping  HEME/LYMPH: No easy bruising, or bleeding gums  ALLERY AND IMMUNOLOGIC: No hives or eczema    MEDICATIONS  (STANDING):  baclofen 10 milliGRAM(s) Oral two times a day  chlorhexidine 4% Liquid 1 Application(s) Topical <User Schedule>  heparin  Injectable 5000 Unit(s) SubCutaneous every 8 hours  melatonin 5 milliGRAM(s) Oral at bedtime    MEDICATIONS  (PRN):  HYDROmorphone  Injectable 1 milliGRAM(s) IV Push every 6 hours PRN Severe Pain (7 - 10)  oxyCODONE    5 mG/acetaminophen 325 mG 1 Tablet(s) Oral every 6 hours PRN Moderate Pain (4 - 6)  sodium chloride 0.9% lock flush 10 milliLiter(s) IV Push every 1 hour PRN Pre/post blood products, medications, blood draw, and to maintain line patency      PHYSICAL EXAM:  Vital Signs Last 24 Hrs  T(C): 36.8 (2019 21:11), Max: 36.8 (2019 22:01)  T(F): 98.3 (2019 21:11), Max: 98.3 (2019 21:11)  HR: 85 (2019 21:11) (76 - 85)  BP: 130/80 (2019 21:11) (102/66 - 138/79)  BP(mean): --  RR: 18 (2019 21:11) (18 - 18)  SpO2: 95% (2019 21:11) (94% - 99%)  I&O's Summary    2019 07:  -  2019 07:00  --------------------------------------------------------  IN: 960 mL / OUT: 1000 mL / NET: -40 mL    2019 07:01  -  2019 21:58  --------------------------------------------------------  IN: 720 mL / OUT: 1700 mL / NET: -980 mL      GENERAL: NAD, well-developed  HEAD:  Atraumatic, Normocephalic  EYES: EOMI, PERRLA, conjunctiva and sclera clear  NECK: Supple, No JVD  CHEST/LUNG: Clear to auscultation bilaterally; No wheeze  HEART: Regular rate and rhythm; No murmurs, rubs, or gallops  ABDOMEN: Soft, Nontender, Nondistended; Bowel sounds present  EXTREMITIES:  2+ Peripheral Pulses, No clubbing, cyanosis, or edema  PSYCH: AAOx3  NEUROLOGY: non-focal  SKIN: No rashes or lesions    LABS:  CAPILLARY BLOOD GLUCOSE                              8.8    7.0   )-----------( 316      ( 2019 07:18 )             26.2     07-03    137  |  102  |  5<L>  ----------------------------<  110<H>  3.7   |  22  |  0.33<L>    Ca    9.3      2019 07:18    TPro  6.3  /  Alb  3.5  /  TBili  0.3  /  DBili  x   /  AST  18  /  ALT  14  /  AlkPhos  89  07-03          Urinalysis Basic - ( 2019 23:54 )    Color: Yellow / Appearance: Clear / S.010 / pH: x  Gluc: x / Ketone: Negative  / Bili: Negative / Urobili: Negative   Blood: x / Protein: 30 mg/dL / Nitrite: Negative   Leuk Esterase: Negative / RBC: 1 /hpf / WBC 2 /HPF   Sq Epi: x / Non Sq Epi: 2 /hpf / Bacteria: Negative        RADIOLOGY & ADDITIONAL TESTS:    Imaging Personally Reviewed:    Consultant(s) Notes Reviewed:      Care Discussed with Consultants/Other Providers:

## 2019-07-03 NOTE — PROGRESS NOTE ADULT - SUBJECTIVE AND OBJECTIVE BOX
CC: Patient is a 58y old  Female who presents with a chief complaint of diverticular perforation    Interval History/ROS:    Rest of ROS negative.    Allergies  No Known Allergies        ANTIMICROBIALS:      OTHER MEDS:  baclofen 10 milliGRAM(s) Oral two times a day  chlorhexidine 4% Liquid 1 Application(s) Topical <User Schedule>  heparin  Injectable 5000 Unit(s) SubCutaneous every 8 hours  HYDROmorphone  Injectable 1 milliGRAM(s) IV Push every 6 hours PRN  melatonin 5 milliGRAM(s) Oral at bedtime  oxyCODONE    5 mG/acetaminophen 325 mG 1 Tablet(s) Oral every 6 hours PRN  sodium chloride 0.9% lock flush 10 milliLiter(s) IV Push every 1 hour PRN      PE:    Vital Signs Last 24 Hrs  T(C): 36.6 (2019 14:41), Max: 36.8 (2019 22:01)  T(F): 97.8 (2019 14:41), Max: 98.2 (2019 22:01)  HR: 78 (2019 14:41) (76 - 80)  BP: 128/75 (2019 14:41) (102/66 - 138/79)  BP(mean): --  RR: 18 (2019 14:41) (18 - 18)  SpO2: 95% (2019 14:41) (94% - 99%)    Gen: AOx3, NAD, non-toxic, pleasant  CV: S1+S2 normal, no murmurs, nontachycardic  Resp: Clear bilat, no resp distress, no crackles/wheezes  Abd: Soft, nontender, +BS  Ext: No LE edema, no wounds  : No Wen  IV/Skin: No thrombophlebitis  Neuro: no focal deficits    LABS:                          8.8    7.0   )-----------( 316      ( 2019 07:18 )             26.2       07-03    137  |  102  |  5<L>  ----------------------------<  110<H>  3.7   |  22  |  0.33<L>    Ca    9.3      2019 07:18    TPro  6.3  /  Alb  3.5  /  TBili  0.3  /  DBili  x   /  AST  18  /  ALT  14  /  AlkPhos  89  07-03      Urinalysis Basic - ( 2019 23:54 )    Color: Yellow / Appearance: Clear / S.010 / pH: x  Gluc: x / Ketone: Negative  / Bili: Negative / Urobili: Negative   Blood: x / Protein: 30 mg/dL / Nitrite: Negative   Leuk Esterase: Negative / RBC: 1 /hpf / WBC 2 /HPF   Sq Epi: x / Non Sq Epi: 2 /hpf / Bacteria: Negative    MICROBIOLOGY:  v    RADIOLOGY:  < from: CT Abdomen and Pelvis w/ Oral Cont and w/ IV Cont (19 @ 08:56) >  IMPRESSION:     Status post Shepherd's procedure.    A few small foci of loculated fluid as above. No drainable collection.      < end of copied text > CC: Patient is a 58y old  Female who presents with a chief complaint of diverticular perforation    ID following for diverticular perforation, antibiotic management, vulvar abscess    Interval History/ROS: Patient had a few staples removed from her abdominal wound. Remains wit ostomy, no fevers, no chills.    Rest of ROS negative.    Allergies  No Known Allergies    ANTIMICROBIALS:      OTHER MEDS:  baclofen 10 milliGRAM(s) Oral two times a day  chlorhexidine 4% Liquid 1 Application(s) Topical <User Schedule>  heparin  Injectable 5000 Unit(s) SubCutaneous every 8 hours  HYDROmorphone  Injectable 1 milliGRAM(s) IV Push every 6 hours PRN  melatonin 5 milliGRAM(s) Oral at bedtime  oxyCODONE    5 mG/acetaminophen 325 mG 1 Tablet(s) Oral every 6 hours PRN  sodium chloride 0.9% lock flush 10 milliLiter(s) IV Push every 1 hour PRN    PE:    Vital Signs Last 24 Hrs  T(C): 36.6 (2019 14:41), Max: 36.8 (2019 22:01)  T(F): 97.8 (2019 14:41), Max: 98.2 (2019 22:01)  HR: 78 (2019 14:41) (76 - 80)  BP: 128/75 (2019 14:41) (102/66 - 138/79)  BP(mean): --  RR: 18 (2019 14:41) (18 - 18)  SpO2: 95% (2019 14:41) (94% - 99%)    Gen: AOx3, NAD, non-toxic, pleasant  CV: S1+S2 normal, no murmurs  Resp: Clear bilat, no resp distress  Abd: Soft, nontender, +BS, +ostomy with bilious drainage, abd wound intact with staples, no drainage today  Ext: No LE edema, no wounds  : No Wen  IV/Skin: No thrombophlebitis  Neuro: no focal deficits    LABS:                          8.8    7.0   )-----------( 316      ( 2019 07:18 )             26.2       07-03    137  |  102  |  5<L>  ----------------------------<  110<H>  3.7   |  22  |  0.33<L>    Ca    9.3      2019 07:18    TPro  6.3  /  Alb  3.5  /  TBili  0.3  /  DBili  x   /  AST  18  /  ALT  14  /  AlkPhos  89  07-03      Urinalysis Basic - ( 2019 23:54 )    Color: Yellow / Appearance: Clear / S.010 / pH: x  Gluc: x / Ketone: Negative  / Bili: Negative / Urobili: Negative   Blood: x / Protein: 30 mg/dL / Nitrite: Negative   Leuk Esterase: Negative / RBC: 1 /hpf / WBC 2 /HPF   Sq Epi: x / Non Sq Epi: 2 /hpf / Bacteria: Negative    MICROBIOLOGY:  v    RADIOLOGY:  < from: CT Abdomen and Pelvis w/ Oral Cont and w/ IV Cont (19 @ 08:56) >  IMPRESSION:     Status post Shepherd's procedure.    A few small foci of loculated fluid as above. No drainable collection.    < end of copied text >

## 2019-07-03 NOTE — PROGRESS NOTE ADULT - ASSESSMENT
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     s/p exploratory laparotomy, L colectomy and Hartmanns on 5/30/19.   Complicated by obstruction RTOR for adhesiolysis on 6/19/19  Abdominal drains removed 6/24/19  No culture data available  Patient was on Meropenem, vancomycin, metronidazole and capsofungin in [place]  Now afebrile  WBC WNL  Reviewed chart from [place].  Reviewed CT A/P with radiology team and no abscess collections, has fluid collections and suspect post op fluid collections.  Vulvar abscess s/p drainage in [place]    Recommend:  -Patient has been on a prolonged course of abx in [place], at this time she has no systemic signs of infection.  -Would monitor off abx at this time  -F/U blood cultures  -Monitor for fevers  -Trend WBC

## 2019-07-03 NOTE — DISCHARGE NOTE PROVIDER - HOSPITAL COURSE
Ms. 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 She states that she was on an [place] cruise at the end of May when she began to have abdominal distention and severe pain. She was taken to an [facility] where she was found to have perforated diverticulitis with multiple perforations of the L colon, for which she underwent an exploratory laparotomy, L colectomy and Hartmanns on 5/30/19. Her hospital course was complicated by obstruction for which she underwent return to OR for adhesiolysis on 6/19/19. Refeeding started 6/23/19, abdominal drains removed 6/24/19, and bladder catheter removed 6/25/19. No culture data available. Patient was on Meropenem, vancomycin, metronidazole and capsofungin.        She presents to ED because she has been unhappy with the care she was receiving in [place]. She would like to find a surgeon to follow up with. She does not have any acute issues and would like to go home today. She is tolerating a diet and having ostomy function. She currently denies abdominal pain. Abdominal wound remains with staples, has some drainage near the inferior portion of the wound. No fevers, no chills.    Seen by Surgery and ID in the ER Ms. Fuller is a 58 year old female with gestational diabetes, lumbar disc disease s/p hardware placement 5011, left nephrectomy when she was 4 yo for congenital anomaly, HTN, cholecystectomy, transfered from an [facility] for diverticular perforation. She states that she was on an [place] cruise at the end of May when she began to have abdominal distention and severe pain. She was taken to an [facility] where she was found to have perforated diverticulitis with multiple perforations of the L colon, for which she underwent an exploratory laparotomy, L colectomy and Hartmanns on 5/30/19. Her hospital course was complicated by obstruction for which she underwent return to OR for adhesiolysis on 6/19/19. Refeeding started 6/23/19, abdominal drains removed 6/24/19, and bladder catheter removed 6/25/19. No culture data available. Patient was on Meropenem, vancomycin, metronidazole and capsofungin.        She presents to ED because she has been unhappy with the care she was receiving in [place]. She would like to find a surgeon to follow up with. She does not have any acute issues and would like to go home today. She is tolerating a diet and having ostomy function. She currently denies abdominal pain. Abdominal wound remains with staples, has some drainage near the inferior portion of the wound. No fevers, no chills.    Seen by Surgery and ID in the ER    CT A/P with  no abscess collections, has fluid collections and suspect post op fluid collections.    Vulvar abscess s/p drainage in [place]. Monotored off Abx as per ID. F/u blood Cx------- Ms. 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 She states that she was on an [place] cruise at the end of May when she began to have abdominal distention and severe pain. She was taken to an [facility] where she was found to have perforated diverticulitis with multiple perforations of the L colon, for which she underwent an exploratory laparotomy, L colectomy and Hartmanns on 5/30/19. Her hospital course was complicated by obstruction for which she underwent return to OR for adhesiolysis on 6/19/19. Refeeding started 6/23/19, abdominal drains removed 6/24/19, and bladder catheter removed 6/25/19. No culture data available. Patient was on Meropenem, vancomycin, metronidazole and capsofungin.        She presents to ED because she has been unhappy with the care she was receiving in [place]. She would like to find a surgeon to follow up with. She does not have any acute issues and would like to go home today. She is tolerating a diet and having ostomy function. She currently denies abdominal pain. Abdominal wound remains with staples, has some drainage near the inferior portion of the wound. No fevers, no chills.    Seen by Surgery and ID in the ER    CT A/P with  no abscess collections, has fluid collections and suspect post op fluid collections.    Vulvar abscess s/p drainage in [place]. Monitored off Abx as per ID. F/u blood Cx------- Ms. 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 She states that she was on an [place] cruise at the end of May when she began to have abdominal distention and severe pain. She was taken to an [facility] where she was found to have perforated diverticulitis with multiple perforations of the L colon, for which she underwent an exploratory laparotomy, L colectomy and Hartmanns on 5/30/19. Her hospital course was complicated by obstruction for which she underwent return to OR for adhesiolysis on 6/19/19. Refeeding started 6/23/19, abdominal drains removed 6/24/19, and bladder catheter removed 6/25/19. No culture data available. Patient was on Meropenem, vancomycin, metronidazole and capsofungin.        She presents to ED because she has been unhappy with the care she was receiving in [place]. She would like to find a surgeon to follow up with. She does not have any acute issues and would like to go home today. She is tolerating a diet and having ostomy function. She currently denies abdominal pain. Abdominal wound remains with staples, has some drainage near the inferior portion of the wound. No fevers, no chills.    Seen by Surgery and ID in the ER    CT A/P with  no abscess collections, has fluid collections and suspect post op fluid collections.    Vulvar abscess s/p drainage in [place]. Monitored off Abx as per ID. BC x 2 with NGTD.

## 2019-07-04 LAB
FERRITIN SERPL-MCNC: 787 NG/ML — HIGH (ref 15–150)
HCT VFR BLD CALC: 25.4 % — LOW (ref 34.5–45)
HGB BLD-MCNC: 8.6 G/DL — LOW (ref 11.5–15.5)
IRON SATN MFR SERPL: 22 % — SIGNIFICANT CHANGE UP (ref 14–50)
IRON SATN MFR SERPL: 28 UG/DL — LOW (ref 30–160)
MCHC RBC-ENTMCNC: 29.6 PG — SIGNIFICANT CHANGE UP (ref 27–34)
MCHC RBC-ENTMCNC: 33.9 GM/DL — SIGNIFICANT CHANGE UP (ref 32–36)
MCV RBC AUTO: 87.3 FL — SIGNIFICANT CHANGE UP (ref 80–100)
PLATELET # BLD AUTO: 332 K/UL — SIGNIFICANT CHANGE UP (ref 150–400)
RBC # BLD: 2.91 M/UL — LOW (ref 3.8–5.2)
RBC # FLD: 13.4 % — SIGNIFICANT CHANGE UP (ref 10.3–14.5)
TIBC SERPL-MCNC: 129 UG/DL — LOW (ref 220–430)
UIBC SERPL-MCNC: 101 UG/DL — LOW (ref 110–370)
WBC # BLD: 7.7 K/UL — SIGNIFICANT CHANGE UP (ref 3.8–10.5)
WBC # FLD AUTO: 7.7 K/UL — SIGNIFICANT CHANGE UP (ref 3.8–10.5)

## 2019-07-04 RX ORDER — OXYCODONE AND ACETAMINOPHEN 5; 325 MG/1; MG/1
1 TABLET ORAL ONCE
Refills: 0 | Status: DISCONTINUED | OUTPATIENT
Start: 2019-07-04 | End: 2019-07-04

## 2019-07-04 RX ADMIN — CHLORHEXIDINE GLUCONATE 1 APPLICATION(S): 213 SOLUTION TOPICAL at 08:32

## 2019-07-04 RX ADMIN — HEPARIN SODIUM 5000 UNIT(S): 5000 INJECTION INTRAVENOUS; SUBCUTANEOUS at 22:10

## 2019-07-04 RX ADMIN — HYDROMORPHONE HYDROCHLORIDE 1 MILLIGRAM(S): 2 INJECTION INTRAMUSCULAR; INTRAVENOUS; SUBCUTANEOUS at 17:04

## 2019-07-04 RX ADMIN — HEPARIN SODIUM 5000 UNIT(S): 5000 INJECTION INTRAVENOUS; SUBCUTANEOUS at 14:15

## 2019-07-04 RX ADMIN — HYDROMORPHONE HYDROCHLORIDE 1 MILLIGRAM(S): 2 INJECTION INTRAMUSCULAR; INTRAVENOUS; SUBCUTANEOUS at 03:09

## 2019-07-04 RX ADMIN — OXYCODONE AND ACETAMINOPHEN 1 TABLET(S): 5; 325 TABLET ORAL at 09:15

## 2019-07-04 RX ADMIN — HYDROMORPHONE HYDROCHLORIDE 1 MILLIGRAM(S): 2 INJECTION INTRAMUSCULAR; INTRAVENOUS; SUBCUTANEOUS at 03:39

## 2019-07-04 RX ADMIN — Medication 10 MILLIGRAM(S): at 18:35

## 2019-07-04 RX ADMIN — Medication 10 MILLIGRAM(S): at 05:40

## 2019-07-04 RX ADMIN — HYDROMORPHONE HYDROCHLORIDE 1 MILLIGRAM(S): 2 INJECTION INTRAMUSCULAR; INTRAVENOUS; SUBCUTANEOUS at 16:48

## 2019-07-04 RX ADMIN — OXYCODONE AND ACETAMINOPHEN 1 TABLET(S): 5; 325 TABLET ORAL at 08:27

## 2019-07-04 RX ADMIN — HYDROMORPHONE HYDROCHLORIDE 1 MILLIGRAM(S): 2 INJECTION INTRAMUSCULAR; INTRAVENOUS; SUBCUTANEOUS at 09:43

## 2019-07-04 RX ADMIN — HYDROMORPHONE HYDROCHLORIDE 1 MILLIGRAM(S): 2 INJECTION INTRAMUSCULAR; INTRAVENOUS; SUBCUTANEOUS at 09:58

## 2019-07-04 RX ADMIN — OXYCODONE AND ACETAMINOPHEN 1 TABLET(S): 5; 325 TABLET ORAL at 22:11

## 2019-07-04 RX ADMIN — OXYCODONE AND ACETAMINOPHEN 1 TABLET(S): 5; 325 TABLET ORAL at 14:06

## 2019-07-04 RX ADMIN — HEPARIN SODIUM 5000 UNIT(S): 5000 INJECTION INTRAVENOUS; SUBCUTANEOUS at 05:40

## 2019-07-04 RX ADMIN — OXYCODONE AND ACETAMINOPHEN 1 TABLET(S): 5; 325 TABLET ORAL at 15:00

## 2019-07-04 RX ADMIN — Medication 5 MILLIGRAM(S): at 22:10

## 2019-07-04 RX ADMIN — OXYCODONE AND ACETAMINOPHEN 1 TABLET(S): 5; 325 TABLET ORAL at 22:45

## 2019-07-04 NOTE — PROGRESS NOTE ADULT - SUBJECTIVE AND OBJECTIVE BOX
Patient is a 58y old  Female who presents with a chief complaint of     SUBJECTIVE / OVERNIGHT EVENTS:  No new symptoms  Afebrile  Tolerating PO  Review of Systems:   CONSTITUTIONAL: No fever, weight loss, +fatigue  EYES: No eye pain, visual disturbances, or discharge  ENMT:  No difficulty hearing, tinnitus, vertigo; No sinus or throat pain  NECK: No pain or stiffness  BREASTS: No pain, masses, or nipple discharge  RESPIRATORY: No cough, wheezing, chills or hemoptysis; No shortness of breath  CARDIOVASCULAR: No chest pain, palpitations, dizziness, or leg swelling  GASTROINTESTINAL: No abdominal or epigastric pain. No nausea, vomiting, or hematemesis; No diarrhea or constipation. No melena or hematochezia.  GENITOURINARY: No dysuria, frequency, hematuria, or incontinence  NEUROLOGICAL: No headaches, memory loss, loss of strength, numbness, or tremors  SKIN: No itching, burning, rashes, or lesions   LYMPH NODES: No enlarged glands  ENDOCRINE: No heat or cold intolerance; No hair loss  MUSCULOSKELETAL: No joint pain or swelling; No muscle, back, or extremity pain  PSYCHIATRIC: No depression, anxiety, mood swings, or difficulty sleeping  HEME/LYMPH: No easy bruising, or bleeding gums  ALLERY AND IMMUNOLOGIC: No hives or eczema    MEDICATIONS  (STANDING):  baclofen 10 milliGRAM(s) Oral two times a day  chlorhexidine 4% Liquid 1 Application(s) Topical <User Schedule>  heparin  Injectable 5000 Unit(s) SubCutaneous every 8 hours  melatonin 5 milliGRAM(s) Oral at bedtime    MEDICATIONS  (PRN):  HYDROmorphone  Injectable 1 milliGRAM(s) IV Push every 6 hours PRN Severe Pain (7 - 10)  oxyCODONE    5 mG/acetaminophen 325 mG 1 Tablet(s) Oral every 6 hours PRN Moderate Pain (4 - 6)  sodium chloride 0.9% lock flush 10 milliLiter(s) IV Push every 1 hour PRN Pre/post blood products, medications, blood draw, and to maintain line patency      PHYSICAL EXAM:  Vital Signs Last 24 Hrs  T(C): 36.8 (2019 21:11), Max: 36.8 (2019 22:01)  T(F): 98.3 (2019 21:11), Max: 98.3 (2019 21:11)  HR: 85 (2019 21:11) (76 - 85)  BP: 130/80 (2019 21:11) (102/66 - 138/79)  BP(mean): --  RR: 18 (2019 21:11) (18 - 18)  SpO2: 95% (2019 21:11) (94% - 99%)  I&O's Summary    2019 07:  -  2019 07:00  --------------------------------------------------------  IN: 960 mL / OUT: 1000 mL / NET: -40 mL    2019 07:01  -  2019 21:58  --------------------------------------------------------  IN: 720 mL / OUT: 1700 mL / NET: -980 mL      GENERAL: NAD, well-developed  HEAD:  Atraumatic, Normocephalic  EYES: EOMI, PERRLA, conjunctiva and sclera clear  NECK: Supple, No JVD  CHEST/LUNG: Clear to auscultation bilaterally; No wheeze  HEART: Regular rate and rhythm; No murmurs, rubs, or gallops  ABDOMEN: Soft, Nontender, Nondistended; Bowel sounds present  EXTREMITIES:  2+ Peripheral Pulses, No clubbing, cyanosis, or edema  PSYCH: AAOx3  NEUROLOGY: non-focal  SKIN: No rashes or lesions    LABS:  CAPILLARY BLOOD GLUCOSE                              8.8    7.0   )-----------( 316      ( 2019 07:18 )             26.2     07-03    137  |  102  |  5<L>  ----------------------------<  110<H>  3.7   |  22  |  0.33<L>    Ca    9.3      2019 07:18    TPro  6.3  /  Alb  3.5  /  TBili  0.3  /  DBili  x   /  AST  18  /  ALT  14  /  AlkPhos  89  07-03          Urinalysis Basic - ( 2019 23:54 )    Color: Yellow / Appearance: Clear / S.010 / pH: x  Gluc: x / Ketone: Negative  / Bili: Negative / Urobili: Negative   Blood: x / Protein: 30 mg/dL / Nitrite: Negative   Leuk Esterase: Negative / RBC: 1 /hpf / WBC 2 /HPF   Sq Epi: x / Non Sq Epi: 2 /hpf / Bacteria: Negative        RADIOLOGY & ADDITIONAL TESTS:    Imaging Personally Reviewed:    Consultant(s) Notes Reviewed:      Care Discussed with Consultants/Other Providers:

## 2019-07-05 RX ORDER — OXYCODONE AND ACETAMINOPHEN 5; 325 MG/1; MG/1
2 TABLET ORAL EVERY 4 HOURS
Refills: 0 | Status: DISCONTINUED | OUTPATIENT
Start: 2019-07-05 | End: 2019-07-06

## 2019-07-05 RX ORDER — OXYCODONE AND ACETAMINOPHEN 5; 325 MG/1; MG/1
1 TABLET ORAL ONCE
Refills: 0 | Status: DISCONTINUED | OUTPATIENT
Start: 2019-07-05 | End: 2019-07-05

## 2019-07-05 RX ADMIN — OXYCODONE AND ACETAMINOPHEN 2 TABLET(S): 5; 325 TABLET ORAL at 23:02

## 2019-07-05 RX ADMIN — OXYCODONE AND ACETAMINOPHEN 1 TABLET(S): 5; 325 TABLET ORAL at 06:31

## 2019-07-05 RX ADMIN — OXYCODONE AND ACETAMINOPHEN 2 TABLET(S): 5; 325 TABLET ORAL at 18:57

## 2019-07-05 RX ADMIN — OXYCODONE AND ACETAMINOPHEN 1 TABLET(S): 5; 325 TABLET ORAL at 16:06

## 2019-07-05 RX ADMIN — OXYCODONE AND ACETAMINOPHEN 1 TABLET(S): 5; 325 TABLET ORAL at 12:13

## 2019-07-05 RX ADMIN — HYDROMORPHONE HYDROCHLORIDE 1 MILLIGRAM(S): 2 INJECTION INTRAMUSCULAR; INTRAVENOUS; SUBCUTANEOUS at 02:00

## 2019-07-05 RX ADMIN — HEPARIN SODIUM 5000 UNIT(S): 5000 INJECTION INTRAVENOUS; SUBCUTANEOUS at 13:37

## 2019-07-05 RX ADMIN — OXYCODONE AND ACETAMINOPHEN 1 TABLET(S): 5; 325 TABLET ORAL at 12:47

## 2019-07-05 RX ADMIN — OXYCODONE AND ACETAMINOPHEN 1 TABLET(S): 5; 325 TABLET ORAL at 16:38

## 2019-07-05 RX ADMIN — HEPARIN SODIUM 5000 UNIT(S): 5000 INJECTION INTRAVENOUS; SUBCUTANEOUS at 21:51

## 2019-07-05 RX ADMIN — HEPARIN SODIUM 5000 UNIT(S): 5000 INJECTION INTRAVENOUS; SUBCUTANEOUS at 05:36

## 2019-07-05 RX ADMIN — OXYCODONE AND ACETAMINOPHEN 1 TABLET(S): 5; 325 TABLET ORAL at 05:36

## 2019-07-05 RX ADMIN — OXYCODONE AND ACETAMINOPHEN 2 TABLET(S): 5; 325 TABLET ORAL at 19:30

## 2019-07-05 RX ADMIN — CHLORHEXIDINE GLUCONATE 1 APPLICATION(S): 213 SOLUTION TOPICAL at 08:52

## 2019-07-05 RX ADMIN — OXYCODONE AND ACETAMINOPHEN 2 TABLET(S): 5; 325 TABLET ORAL at 23:30

## 2019-07-05 RX ADMIN — Medication 10 MILLIGRAM(S): at 05:36

## 2019-07-05 RX ADMIN — HYDROMORPHONE HYDROCHLORIDE 1 MILLIGRAM(S): 2 INJECTION INTRAMUSCULAR; INTRAVENOUS; SUBCUTANEOUS at 01:25

## 2019-07-05 RX ADMIN — Medication 10 MILLIGRAM(S): at 17:14

## 2019-07-05 RX ADMIN — Medication 5 MILLIGRAM(S): at 21:51

## 2019-07-05 NOTE — CHART NOTE - NSCHARTNOTEFT_GEN_A_CORE
on aseptic precaution left arm single lumen PICC line removed, after applied a min pressure a clean dressing applied, no bleeding observed. She tolerated well.  Maya Dickens) Spect[employer]Link # [id number]

## 2019-07-06 VITALS
TEMPERATURE: 98 F | RESPIRATION RATE: 18 BRPM | DIASTOLIC BLOOD PRESSURE: 84 MMHG | HEART RATE: 66 BPM | SYSTOLIC BLOOD PRESSURE: 148 MMHG | OXYGEN SATURATION: 99 %

## 2019-07-06 LAB
HCT VFR BLD CALC: 26.8 % — LOW (ref 34.5–45)
HGB BLD-MCNC: 8.9 G/DL — LOW (ref 11.5–15.5)
MCHC RBC-ENTMCNC: 29.5 PG — SIGNIFICANT CHANGE UP (ref 27–34)
MCHC RBC-ENTMCNC: 33.2 GM/DL — SIGNIFICANT CHANGE UP (ref 32–36)
MCV RBC AUTO: 88.8 FL — SIGNIFICANT CHANGE UP (ref 80–100)
PLATELET # BLD AUTO: 267 K/UL — SIGNIFICANT CHANGE UP (ref 150–400)
RBC # BLD: 3.02 M/UL — LOW (ref 3.8–5.2)
RBC # FLD: 13.8 % — SIGNIFICANT CHANGE UP (ref 10.3–14.5)
WBC # BLD: 5.1 K/UL — SIGNIFICANT CHANGE UP (ref 3.8–10.5)
WBC # FLD AUTO: 5.1 K/UL — SIGNIFICANT CHANGE UP (ref 3.8–10.5)

## 2019-07-06 PROCEDURE — 85027 COMPLETE CBC AUTOMATED: CPT

## 2019-07-06 PROCEDURE — 97530 THERAPEUTIC ACTIVITIES: CPT

## 2019-07-06 PROCEDURE — 99285 EMERGENCY DEPT VISIT HI MDM: CPT | Mod: 25

## 2019-07-06 PROCEDURE — 83550 IRON BINDING TEST: CPT

## 2019-07-06 PROCEDURE — 71045 X-RAY EXAM CHEST 1 VIEW: CPT

## 2019-07-06 PROCEDURE — 74177 CT ABD & PELVIS W/CONTRAST: CPT

## 2019-07-06 PROCEDURE — 97162 PT EVAL MOD COMPLEX 30 MIN: CPT

## 2019-07-06 PROCEDURE — 96374 THER/PROPH/DIAG INJ IV PUSH: CPT | Mod: XU

## 2019-07-06 PROCEDURE — 97116 GAIT TRAINING THERAPY: CPT

## 2019-07-06 PROCEDURE — 83540 ASSAY OF IRON: CPT

## 2019-07-06 PROCEDURE — 81001 URINALYSIS AUTO W/SCOPE: CPT

## 2019-07-06 PROCEDURE — 86803 HEPATITIS C AB TEST: CPT

## 2019-07-06 PROCEDURE — 84478 ASSAY OF TRIGLYCERIDES: CPT

## 2019-07-06 PROCEDURE — 87040 BLOOD CULTURE FOR BACTERIA: CPT

## 2019-07-06 PROCEDURE — 83690 ASSAY OF LIPASE: CPT

## 2019-07-06 PROCEDURE — 82728 ASSAY OF FERRITIN: CPT

## 2019-07-06 PROCEDURE — 80053 COMPREHEN METABOLIC PANEL: CPT

## 2019-07-06 RX ORDER — BACLOFEN 100 %
1 POWDER (GRAM) MISCELLANEOUS
Qty: 60 | Refills: 0
Start: 2019-07-06 | End: 2019-08-04

## 2019-07-06 RX ADMIN — Medication 10 MILLIGRAM(S): at 05:34

## 2019-07-06 RX ADMIN — OXYCODONE AND ACETAMINOPHEN 2 TABLET(S): 5; 325 TABLET ORAL at 13:27

## 2019-07-06 RX ADMIN — HEPARIN SODIUM 5000 UNIT(S): 5000 INJECTION INTRAVENOUS; SUBCUTANEOUS at 05:34

## 2019-07-06 RX ADMIN — OXYCODONE AND ACETAMINOPHEN 2 TABLET(S): 5; 325 TABLET ORAL at 12:53

## 2019-07-06 RX ADMIN — OXYCODONE AND ACETAMINOPHEN 2 TABLET(S): 5; 325 TABLET ORAL at 03:55

## 2019-07-06 RX ADMIN — HEPARIN SODIUM 5000 UNIT(S): 5000 INJECTION INTRAVENOUS; SUBCUTANEOUS at 12:55

## 2019-07-06 RX ADMIN — OXYCODONE AND ACETAMINOPHEN 2 TABLET(S): 5; 325 TABLET ORAL at 09:40

## 2019-07-06 RX ADMIN — OXYCODONE AND ACETAMINOPHEN 2 TABLET(S): 5; 325 TABLET ORAL at 09:01

## 2019-07-06 RX ADMIN — OXYCODONE AND ACETAMINOPHEN 2 TABLET(S): 5; 325 TABLET ORAL at 04:30

## 2019-07-07 LAB
CULTURE RESULTS: SIGNIFICANT CHANGE UP
CULTURE RESULTS: SIGNIFICANT CHANGE UP
SPECIMEN SOURCE: SIGNIFICANT CHANGE UP
SPECIMEN SOURCE: SIGNIFICANT CHANGE UP

## 2019-07-08 PROBLEM — I10 HYPERTENSION: Status: ACTIVE | Noted: 2019-07-08

## 2019-07-08 PROBLEM — K57.80 DIVERTICULITIS OF INTESTINE WITH PERFORATION AND ABSCESS: Status: RESOLVED | Noted: 2019-07-08 | Resolved: 2019-07-08

## 2019-07-08 PROBLEM — E04.9 GOITER: Status: ACTIVE | Noted: 2019-07-08

## 2019-07-08 PROBLEM — Q62.7 CONGENITAL VESICO-URETERO-RENAL REFLUX: Status: RESOLVED | Noted: 2019-07-08 | Resolved: 2019-07-08

## 2019-07-08 PROBLEM — Z87.01 HISTORY OF PNEUMONIA: Status: RESOLVED | Noted: 2019-07-08 | Resolved: 2019-07-08

## 2019-07-08 PROBLEM — M51.9 LUMBAR DISC DISEASE: Status: ACTIVE | Noted: 2019-07-08

## 2019-07-08 PROBLEM — Z86.32 HISTORY OF GESTATIONAL DIABETES MELLITUS (GDM): Status: RESOLVED | Noted: 2019-07-08 | Resolved: 2019-07-08

## 2019-07-08 PROBLEM — N90.9 VULVAR MASS: Status: RESOLVED | Noted: 2019-07-08 | Resolved: 2019-07-08

## 2019-07-09 ENCOUNTER — APPOINTMENT (OUTPATIENT)
Dept: SURGERY | Facility: CLINIC | Age: 59
End: 2019-07-09
Payer: COMMERCIAL

## 2019-07-09 VITALS
HEIGHT: 60 IN | DIASTOLIC BLOOD PRESSURE: 97 MMHG | WEIGHT: 122 LBS | OXYGEN SATURATION: 100 % | HEART RATE: 82 BPM | SYSTOLIC BLOOD PRESSURE: 159 MMHG | RESPIRATION RATE: 16 BRPM | TEMPERATURE: 98.8 F | BODY MASS INDEX: 23.95 KG/M2

## 2019-07-09 DIAGNOSIS — Z83.3 FAMILY HISTORY OF DIABETES MELLITUS: ICD-10-CM

## 2019-07-09 DIAGNOSIS — Z87.01 PERSONAL HISTORY OF PNEUMONIA (RECURRENT): ICD-10-CM

## 2019-07-09 DIAGNOSIS — K57.80 DIVERTICULITIS OF INTESTINE, PART UNSPECIFIED, WITH PERFORATION AND ABSCESS W/OUT BLEEDING: ICD-10-CM

## 2019-07-09 DIAGNOSIS — E04.9 NONTOXIC GOITER, UNSPECIFIED: ICD-10-CM

## 2019-07-09 DIAGNOSIS — Q62.7 CONGENITAL VESICO-URETERO-RENAL REFLUX: ICD-10-CM

## 2019-07-09 DIAGNOSIS — Z86.32 PERSONAL HISTORY OF GESTATIONAL DIABETES: ICD-10-CM

## 2019-07-09 DIAGNOSIS — Z87.891 PERSONAL HISTORY OF NICOTINE DEPENDENCE: ICD-10-CM

## 2019-07-09 DIAGNOSIS — I10 ESSENTIAL (PRIMARY) HYPERTENSION: ICD-10-CM

## 2019-07-09 DIAGNOSIS — M51.9 UNSPECIFIED THORACIC, THORACOLUMBAR AND LUMBOSACRAL INTERVERTEBRAL DISC DISORDER: ICD-10-CM

## 2019-07-09 DIAGNOSIS — N90.9 NONINFLAMMATORY DISORDER OF VULVA AND PERINEUM, UNSPECIFIED: ICD-10-CM

## 2019-07-09 DIAGNOSIS — Z81.8 FAMILY HISTORY OF OTHER MENTAL AND BEHAVIORAL DISORDERS: ICD-10-CM

## 2019-07-09 PROCEDURE — 99214 OFFICE O/P EST MOD 30 MIN: CPT

## 2019-07-09 RX ORDER — NAPROXEN 500 MG/1
500 TABLET ORAL
Qty: 60 | Refills: 0 | Status: DISCONTINUED | COMMUNITY
Start: 2019-05-15

## 2019-07-09 RX ORDER — FENOFIBRATE 48 MG/1
48 TABLET ORAL
Qty: 90 | Refills: 0 | Status: DISCONTINUED | COMMUNITY
Start: 2019-01-22

## 2019-07-09 RX ORDER — FOLIC ACID 1 MG/1
1 TABLET ORAL
Qty: 30 | Refills: 0 | Status: DISCONTINUED | COMMUNITY
Start: 2019-02-18

## 2019-07-09 RX ORDER — GABAPENTIN 600 MG/1
600 TABLET, COATED ORAL
Qty: 30 | Refills: 0 | Status: DISCONTINUED | COMMUNITY
Start: 2019-03-16

## 2019-07-09 RX ORDER — GABAPENTIN 400 MG/1
400 CAPSULE ORAL
Qty: 90 | Refills: 0 | Status: DISCONTINUED | COMMUNITY
Start: 2019-02-18

## 2019-07-09 RX ORDER — DICLOFENAC SODIUM 75 MG/1
75 TABLET, DELAYED RELEASE ORAL
Qty: 30 | Refills: 0 | Status: DISCONTINUED | COMMUNITY
Start: 2019-02-05

## 2019-07-09 RX ORDER — GABAPENTIN 100 MG/1
100 CAPSULE ORAL
Qty: 170 | Refills: 0 | Status: DISCONTINUED | COMMUNITY
Start: 2019-02-05

## 2019-07-09 RX ORDER — DICLOFENAC SODIUM 100 MG/1
100 TABLET, FILM COATED, EXTENDED RELEASE ORAL
Qty: 30 | Refills: 0 | Status: DISCONTINUED | COMMUNITY
Start: 2019-02-18

## 2019-07-09 RX ORDER — OXYCODONE AND ACETAMINOPHEN 5; 325 MG/1; MG/1
5-325 TABLET ORAL
Qty: 18 | Refills: 0 | Status: DISCONTINUED | COMMUNITY
Start: 2019-07-06

## 2019-07-09 NOTE — HISTORY OF PRESENT ILLNESS
[FreeTextEntry1] : Marcie is a 57 y/o female here for evaluation of colostomy reversal. Patient is s/p exploratory laparotomy, left colectomy and Carolyn on 5/30/19 indicated for perforated diverticulitis. Her post-operative course was complicated by obstruction and was taken back to the OR for adhesiolysis on 6/19/19. Today, patient reports moderate incisional pain. Has no issues with ostomy. Reports consistency is watery. Never had a colonoscopy

## 2019-07-09 NOTE — ASSESSMENT
[FreeTextEntry1] : Patient still exhausted and traumatized from hospital experience in [place].  She is eating and caring for her ostomy. She is slowly regaining strength. Remaining sutures and staples removed. Patient will apply bacitracin and dry dressing to ulcerated wound caudad from colostomy appliance. I will see patient again in 3 weeks. Patient will need to wait 3-6 months before ostomy closure. The patient must be at full strength prior to surgery. Patient will need colonoscopy and sigmoidoscopy prior to surgery.

## 2019-07-09 NOTE — PHYSICAL EXAM
[Normal Breath Sounds] : Normal breath sounds [Normal Rate and Rhythm] : normal rate and rhythm [Normal Heart Sounds] : normal heart sounds [No Rash or Lesion] : No rash or lesion [Oriented to Person] : oriented to person [Alert] : alert [Oriented to Time] : oriented to time [Oriented to Place] : oriented to place [Calm] : calm [Abdomen Masses] : No abdominal masses [Abdomen Tenderness] : ~T No ~M abdominal tenderness [JVD] : no jugular venous distention  [de-identified] : Normal ostomy with silk sutures--removed, healed midline wound, infrastomal ulceration with silk suture, removed [de-identified] : Well nourished female, in no apparent distress [de-identified] : WNL [de-identified] : Full ROM

## 2019-07-10 ENCOUNTER — APPOINTMENT (OUTPATIENT)
Dept: SURGERY | Facility: CLINIC | Age: 59
End: 2019-07-10

## 2019-07-23 ENCOUNTER — EMERGENCY (EMERGENCY)
Facility: HOSPITAL | Age: 59
LOS: 1 days | Discharge: HOME | End: 2019-07-23
Attending: EMERGENCY MEDICINE
Payer: COMMERCIAL

## 2019-07-23 VITALS
HEART RATE: 92 BPM | SYSTOLIC BLOOD PRESSURE: 108 MMHG | OXYGEN SATURATION: 97 % | RESPIRATION RATE: 18 BRPM | DIASTOLIC BLOOD PRESSURE: 80 MMHG | TEMPERATURE: 98 F

## 2019-07-23 VITALS — WEIGHT: 121.92 LBS | HEIGHT: 60 IN

## 2019-07-23 LAB
ALBUMIN SERPL ELPH-MCNC: 3.9 G/DL — SIGNIFICANT CHANGE UP (ref 3.3–5)
ALP SERPL-CCNC: 92 U/L — SIGNIFICANT CHANGE UP (ref 40–120)
ALT FLD-CCNC: 18 U/L — SIGNIFICANT CHANGE UP (ref 10–45)
ANION GAP SERPL CALC-SCNC: 15 MMOL/L — SIGNIFICANT CHANGE UP (ref 5–17)
APPEARANCE UR: ABNORMAL
APTT BLD: 27.6 SEC — SIGNIFICANT CHANGE UP (ref 27.5–36.3)
AST SERPL-CCNC: 14 U/L — SIGNIFICANT CHANGE UP (ref 10–40)
BACTERIA # UR AUTO: ABNORMAL
BASOPHILS # BLD AUTO: 0 K/UL — SIGNIFICANT CHANGE UP (ref 0–0.2)
BASOPHILS NFR BLD AUTO: 0.1 % — SIGNIFICANT CHANGE UP (ref 0–2)
BILIRUB SERPL-MCNC: 0.5 MG/DL — SIGNIFICANT CHANGE UP (ref 0.2–1.2)
BILIRUB UR-MCNC: NEGATIVE — SIGNIFICANT CHANGE UP
BUN SERPL-MCNC: 17 MG/DL — SIGNIFICANT CHANGE UP (ref 7–23)
CALCIUM SERPL-MCNC: 9.6 MG/DL — SIGNIFICANT CHANGE UP (ref 8.4–10.5)
CHLORIDE SERPL-SCNC: 99 MMOL/L — SIGNIFICANT CHANGE UP (ref 96–108)
CO2 SERPL-SCNC: 21 MMOL/L — LOW (ref 22–31)
COLOR SPEC: SIGNIFICANT CHANGE UP
CREAT SERPL-MCNC: 0.74 MG/DL — SIGNIFICANT CHANGE UP (ref 0.5–1.3)
DIFF PNL FLD: ABNORMAL
EOSINOPHIL # BLD AUTO: 0.2 K/UL — SIGNIFICANT CHANGE UP (ref 0–0.5)
EOSINOPHIL NFR BLD AUTO: 1.9 % — SIGNIFICANT CHANGE UP (ref 0–6)
EPI CELLS # UR: 2 — SIGNIFICANT CHANGE UP
GAS PNL BLDV: SIGNIFICANT CHANGE UP
GLUCOSE SERPL-MCNC: 122 MG/DL — HIGH (ref 70–99)
GLUCOSE UR QL: NEGATIVE — SIGNIFICANT CHANGE UP
HCT VFR BLD CALC: 31.1 % — LOW (ref 34.5–45)
HGB BLD-MCNC: 10.1 G/DL — LOW (ref 11.5–15.5)
HYALINE CASTS # UR AUTO: 0 /LPF — SIGNIFICANT CHANGE UP (ref 0–7)
INR BLD: 1.06 RATIO — SIGNIFICANT CHANGE UP (ref 0.88–1.16)
KETONES UR-MCNC: NEGATIVE — SIGNIFICANT CHANGE UP
LEUKOCYTE ESTERASE UR-ACNC: ABNORMAL
LIDOCAIN IGE QN: 262 U/L — HIGH (ref 7–60)
LYMPHOCYTES # BLD AUTO: 1.5 K/UL — SIGNIFICANT CHANGE UP (ref 1–3.3)
LYMPHOCYTES # BLD AUTO: 15.4 % — SIGNIFICANT CHANGE UP (ref 13–44)
MCHC RBC-ENTMCNC: 28.2 PG — SIGNIFICANT CHANGE UP (ref 27–34)
MCHC RBC-ENTMCNC: 32.5 GM/DL — SIGNIFICANT CHANGE UP (ref 32–36)
MCV RBC AUTO: 86.6 FL — SIGNIFICANT CHANGE UP (ref 80–100)
MONOCYTES # BLD AUTO: 1 K/UL — HIGH (ref 0–0.9)
MONOCYTES NFR BLD AUTO: 10.4 % — SIGNIFICANT CHANGE UP (ref 2–14)
NEUTROPHILS # BLD AUTO: 7.2 K/UL — SIGNIFICANT CHANGE UP (ref 1.8–7.4)
NEUTROPHILS NFR BLD AUTO: 72.2 % — SIGNIFICANT CHANGE UP (ref 43–77)
NITRITE UR-MCNC: NEGATIVE — SIGNIFICANT CHANGE UP
OB PNL STL: NEGATIVE — SIGNIFICANT CHANGE UP
PH UR: 6.5 — SIGNIFICANT CHANGE UP (ref 5–8)
PLATELET # BLD AUTO: 175 K/UL — SIGNIFICANT CHANGE UP (ref 150–400)
POTASSIUM SERPL-MCNC: 4.3 MMOL/L — SIGNIFICANT CHANGE UP (ref 3.5–5.3)
POTASSIUM SERPL-SCNC: 4.3 MMOL/L — SIGNIFICANT CHANGE UP (ref 3.5–5.3)
PROT SERPL-MCNC: 7.2 G/DL — SIGNIFICANT CHANGE UP (ref 6–8.3)
PROT UR-MCNC: ABNORMAL
PROTHROM AB SERPL-ACNC: 12.2 SEC — SIGNIFICANT CHANGE UP (ref 10–12.9)
RBC # BLD: 3.59 M/UL — LOW (ref 3.8–5.2)
RBC # FLD: 14.7 % — HIGH (ref 10.3–14.5)
RBC CASTS # UR COMP ASSIST: 10 /HPF — HIGH (ref 0–4)
SODIUM SERPL-SCNC: 135 MMOL/L — SIGNIFICANT CHANGE UP (ref 135–145)
SP GR SPEC: 1.02 — SIGNIFICANT CHANGE UP (ref 1.01–1.02)
UROBILINOGEN FLD QL: NEGATIVE — SIGNIFICANT CHANGE UP
WBC # BLD: 10 K/UL — SIGNIFICANT CHANGE UP (ref 3.8–10.5)
WBC # FLD AUTO: 10 K/UL — SIGNIFICANT CHANGE UP (ref 3.8–10.5)
WBC UR QL: >50

## 2019-07-23 PROCEDURE — 84295 ASSAY OF SERUM SODIUM: CPT

## 2019-07-23 PROCEDURE — 74177 CT ABD & PELVIS W/CONTRAST: CPT

## 2019-07-23 PROCEDURE — 82330 ASSAY OF CALCIUM: CPT

## 2019-07-23 PROCEDURE — 83690 ASSAY OF LIPASE: CPT

## 2019-07-23 PROCEDURE — 85610 PROTHROMBIN TIME: CPT

## 2019-07-23 PROCEDURE — 82435 ASSAY OF BLOOD CHLORIDE: CPT

## 2019-07-23 PROCEDURE — 82272 OCCULT BLD FECES 1-3 TESTS: CPT

## 2019-07-23 PROCEDURE — 99285 EMERGENCY DEPT VISIT HI MDM: CPT

## 2019-07-23 PROCEDURE — 85027 COMPLETE CBC AUTOMATED: CPT

## 2019-07-23 PROCEDURE — 81001 URINALYSIS AUTO W/SCOPE: CPT

## 2019-07-23 PROCEDURE — 99284 EMERGENCY DEPT VISIT MOD MDM: CPT | Mod: 25

## 2019-07-23 PROCEDURE — 85014 HEMATOCRIT: CPT

## 2019-07-23 PROCEDURE — 96374 THER/PROPH/DIAG INJ IV PUSH: CPT | Mod: XU

## 2019-07-23 PROCEDURE — 82947 ASSAY GLUCOSE BLOOD QUANT: CPT

## 2019-07-23 PROCEDURE — 85730 THROMBOPLASTIN TIME PARTIAL: CPT

## 2019-07-23 PROCEDURE — 80053 COMPREHEN METABOLIC PANEL: CPT

## 2019-07-23 PROCEDURE — 87086 URINE CULTURE/COLONY COUNT: CPT

## 2019-07-23 PROCEDURE — 74177 CT ABD & PELVIS W/CONTRAST: CPT | Mod: 26

## 2019-07-23 PROCEDURE — 96375 TX/PRO/DX INJ NEW DRUG ADDON: CPT | Mod: XU

## 2019-07-23 PROCEDURE — 83605 ASSAY OF LACTIC ACID: CPT

## 2019-07-23 PROCEDURE — 84132 ASSAY OF SERUM POTASSIUM: CPT

## 2019-07-23 PROCEDURE — 87186 SC STD MICRODIL/AGAR DIL: CPT

## 2019-07-23 PROCEDURE — 82803 BLOOD GASES ANY COMBINATION: CPT

## 2019-07-23 RX ORDER — MORPHINE SULFATE 50 MG/1
4 CAPSULE, EXTENDED RELEASE ORAL ONCE
Refills: 0 | Status: DISCONTINUED | OUTPATIENT
Start: 2019-07-23 | End: 2019-07-23

## 2019-07-23 RX ORDER — SODIUM CHLORIDE 9 MG/ML
2000 INJECTION INTRAMUSCULAR; INTRAVENOUS; SUBCUTANEOUS ONCE
Refills: 0 | Status: COMPLETED | OUTPATIENT
Start: 2019-07-23 | End: 2019-07-23

## 2019-07-23 RX ORDER — CEFPODOXIME PROXETIL 100 MG
1 TABLET ORAL
Qty: 14 | Refills: 0
Start: 2019-07-23 | End: 2019-07-29

## 2019-07-23 RX ORDER — CYCLOBENZAPRINE HYDROCHLORIDE 10 MG/1
1 TABLET, FILM COATED ORAL
Qty: 90 | Refills: 0
Start: 2019-07-23 | End: 2019-08-21

## 2019-07-23 RX ORDER — OXYCODONE HYDROCHLORIDE 5 MG/1
5 TABLET ORAL ONCE
Refills: 0 | Status: DISCONTINUED | OUTPATIENT
Start: 2019-07-23 | End: 2019-07-23

## 2019-07-23 RX ORDER — CEFTRIAXONE 500 MG/1
1000 INJECTION, POWDER, FOR SOLUTION INTRAMUSCULAR; INTRAVENOUS ONCE
Refills: 0 | Status: COMPLETED | OUTPATIENT
Start: 2019-07-23 | End: 2019-07-23

## 2019-07-23 RX ADMIN — MORPHINE SULFATE 4 MILLIGRAM(S): 50 CAPSULE, EXTENDED RELEASE ORAL at 05:08

## 2019-07-23 RX ADMIN — CEFTRIAXONE 100 MILLIGRAM(S): 500 INJECTION, POWDER, FOR SOLUTION INTRAMUSCULAR; INTRAVENOUS at 09:41

## 2019-07-23 RX ADMIN — SODIUM CHLORIDE 2000 MILLILITER(S): 9 INJECTION INTRAMUSCULAR; INTRAVENOUS; SUBCUTANEOUS at 05:07

## 2019-07-23 RX ADMIN — OXYCODONE HYDROCHLORIDE 5 MILLIGRAM(S): 5 TABLET ORAL at 09:41

## 2019-07-23 NOTE — ED PROVIDER NOTE - CLINICAL SUMMARY MEDICAL DECISION MAKING FREE TEXT BOX
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 CTAP was negative. Morphine IV and oxycodone for pain management while in ED. UTI was found on UA. Will discharge on cefpodoxime 100 mg PO BID. To advise to continue with home baclofen.

## 2019-07-23 NOTE — ED PROVIDER NOTE - NS ED ROS FT
CONSTITUTIONAL: No fever  EYES/ENT: No visual changes;    NECK: No pain   RESPIRATORY: No cough, wheezing, hemoptysis; No shortness of breath  CARDIOVASCULAR: No chest pain or palpitations  GASTROINTESTINAL: No abdominal or epigastric pain. No nausea, vomiting, or hematemesis; No diarrhea or constipation. No melena or hematochezia.  GENITOURINARY: No dysuria, frequency or hematuria  MSK: Left buttock pain   NEUROLOGICAL: No numbness or weakness  SKIN: No rashes

## 2019-07-23 NOTE — ED ADULT NURSE NOTE - OBJECTIVE STATEMENT
57 y/o female pmhx lumbar disc disease s/p hardware placement, HTN, cholecystectomy, diverticular perforation s/p exploratory laparotomy, L colectomy and Carolyn's on 5/30/19 presenting to ED c/o left sided buttock/low back pain. Reports symptoms began x 4 days ago with radiation to left hip. Pain is so severe causing the patient to be unable to ambulate. Pt denies headache, dizziness, chest pain, palpitations, cough, SOB, abdominal pain, n/v/d, urinary symptoms, fevers, chills, weakness at this time. A&Ox4 gross neuro intact, lungs cta bilaterally, no difficulty speaking in complete sentences, s1s2 heart sounds heard, pulses x 4, presley x4, abdomen soft nontender nondistended, ostomy on LLQ intact draining soft stools, skin intact.

## 2019-07-23 NOTE — ED PROVIDER NOTE - PROGRESS NOTE DETAILS
Ct negative for any intra-abdominal pathology.  there is stool output in the ostomy bag.  Pt still c/o pain in left buttock, will give Po analgesia.  Pt with UTI - abx given and sent to pharmacy.  Pt ambulatory in ER and in agreement with discharge and out patient follow up with PMD and surgeon as long as we are able to give her some ostomy bags for home as she is almost out of supplies and her ostomy nurse has not received her new supply yet.  - Kate Law DO

## 2019-07-23 NOTE — ED PROVIDER NOTE - NSFOLLOWUPINSTRUCTIONS_ED_ALL_ED_FT
You presented with back pain. You were given morphine for pain management. CT abdomen and pelvis did not reveal any source of infection. Please continue to take baclofen (home medication). Also you were found to have urinary tract infection on urinalysis. You were prescribed antibiotics (cefpodoxime 100 mg) to go home with. Please take antibiotics every 12 hours for 7 days. Please follow up with your PMD within 1-2 weeks of discharge. You presented with back pain. You were given morphine for pain management. CT abdomen and pelvis did not reveal any source of infection. Please continue to take baclofen (home medication). Also you were found to have urinary tract infection on urinalysis. You were given ceftriaxone while you were in the ED. You were prescribed antibiotics (cefpodoxime 100 mg) to go home with. Please take antibiotics every 12 hours for 7 days. Please follow up with your PMD within 1-2 weeks of discharge.

## 2019-07-23 NOTE — CHART NOTE - NSCHARTNOTEFT_GEN_A_CORE
Search Terms: Marcie Fuller, 1960 Search Date: 07/23/2019 08:49:53 AM Searching on behalf of: [address] [place] [address]  The Drug Utilization Report below displays all of the controlled substance prescriptions, if any, that your patient has filled in the last twelve months. The information displayed on this report is compiled from pharmacy submissions to the Department, and accurately reflects the information as submitted by the pharmacies.    This report was requested by: Otis Eddy | Reference #: [id number]    There are no results for the search terms that you entered.

## 2019-07-23 NOTE — ED ADULT NURSE REASSESSMENT NOTE - NS ED NURSE REASSESS COMMENT FT1
Patient a&ox3, no acute distress, resp nonlabored, resting comfortably in bed with family at bedside.  C/o mild back pain at this time. Patient ambulating well on own with 1 assist to the bathroom.  Denies headache, dizziness, chest pain, palpitations, SOB, abd pain, n/v/d, urinary symptoms, fevers, chills, weakness at this time. Patient awaiting CT scan results. Safety maintained.

## 2019-07-23 NOTE — ED ADULT NURSE NOTE - CHPI ED NUR SYMPTOMS NEG
no bladder dysfunction/no numbness/no neck tenderness/no tingling/no difficulty bearing weight/no constipation/no motor function loss/no bowel dysfunction/no anorexia/no fatigue

## 2019-07-23 NOTE — ED PROVIDER NOTE - CARE PROVIDER_API CALL
Lanre Navarro)  ColonRectal Surgery; Surgery  [address], [address]  [place] [address]  Phone: [phone]  Fax: [phone]  Follow Up Time:

## 2019-07-23 NOTE — ED ADULT NURSE NOTE - NS ED NURSE DC PT EDUCATION RESOURCES
Patient Name: Ly Bermudez   Procedure Date: 2/10/2017 11:54 AM   MRN: [id number]   Account Number: [id number]   YOB: 1961   Admit Type: Outpatient   Age: 55   Gender: Female   Race: Unknown   Attending MD: Michelle Chamorro MD   Grafts or Implants: Grafts or Implants Not Applicable   Procedure:            Upper GI endoscopy with esophageal dilatation and                         biopsy.   Indications:          Progressive solid food dysphagia.   Providers:            Michelle Chamorro MD   Sedation:             Monitored Anesthesia Care   Procedure:        Pre-Anesthesia Assessment:        - Prior to the procedure, a History and Physical was performed, and        patient medications, allergies and sensitivities were reviewed. The        patient's tolerance of previous anesthesia was reviewed.        - Prior to the procedure, a History and Physical was performed, and        patient medications, allergies and sensitivities were reviewed. The        patient's tolerance of previous anesthesia was reviewed.        - The risks and benefits of the procedure and the sedation options and        risks were discussed with the patient. All questions were answered and        informed consent was obtained.        - Patient identification and proposed procedure were verified prior to        the procedure by the physician, the nurse and the anesthesiologist. The        procedure was verified in the endoscopy suite.        A History and Physical was performed prior to the procedure. Patient        medications and allergies were reviewed. The risks and benefits of the        procedure and sedation options were discussed. Questions were answered        and informed consent was obtained. Patient identification and proposed        procedure were verified. The patient was deemed in satisfactory        condition to undergo the procedure. The heart rate, respiratory rate,        oxygen saturations, blood pressure and  response to sedation were        monitored throughout the procedure.        The endoscope was passed under direct vision. The upper GI endoscopy was        accomplished without difficulty. The patient tolerated the procedure        well.   Findings:        Benign-appearing intrinsic proximal esophageal stricture, status post        successful dilatation in increments using a 20 Amharic savory dilator.        Antral nonerosive gastritis, status post biopsy.        Normal duodenal examination and normal-appearing major papilla.   Impression:        Procedure: EGD with biopsy and esophageal dilatation using savory        dilator.        Please refer to postoperative finding and diagnosis.   Recommendation:        - Discharge patient to home.        - Resume previous diet.   Complications:        No immediate complications.   Estimated Blood Loss: Estimated blood loss was minimal.   Michelle Chamorro M.D.   Michelle Chamorro MD   2/10/2017 3:32:24 PM   This report has been signed electronically by the above.   Number of Addenda: 0        No Specimens removed during this procedure unless otherwise noted.      Yes

## 2019-07-23 NOTE — ED PROVIDER NOTE - PLAN OF CARE
Follow up with your PMD within 1-2 weeks of discharge. You presented with back pain. You were given morphine for pain management. CT abdomen and pelvis did not reveal any source of infection. Please continue to take baclofen (home medication). Please follow up with your PMD within 1-2 weeks of discharge.

## 2019-07-23 NOTE — ED PROVIDER NOTE - ATTENDING CONTRIBUTION TO CARE
Attending MD Tamez: I personally have seen and examined this patient.  Resident note reviewed and agree on plan of care and except where noted.  See below for details.     Seen in [facility], accompanied by     58F with PMH/PSH including HTN on triamterene, cholecystectomy, L nephrectomy, lower back surgery "with 7 screws", bilateral hip replacements, anxiety on Xanax presents to the ED with L side top of buttock.  Reports that when she moves anything it all it worsens the pain there.  Reports pain started 4 days ago, reports last night worsened.  Reports 2 operations in [place], ostomy and then revision.  Reports was air transported here and was discharged 7/6/19. Reports did not have further surgeries while she was here.  Reports since then she has been feeling ok but over the last 4 days had worsened pain.  Denies fevers.  Denies chest pain, shortness of breath.  Denies abdominal pain, vomiting, reports minimal ostomy output.  Reports last po intake was at around 630pm.  Denies blood in ostomy.  Reports two days ago had large blood clot per rectum.  Reports happened again today where she felt like she had to move her bowels and then clot.  Reports the initial clot was darker red and todays was brighter red.  Denies dysuria, hematuria, change in urinary habits including frequency, urgency. Meds on Baclofen.  On exam, NAD, head NCAT, PERRL, ranging neck freely, no tenderness to midline palpation, no stepoffs along length of spine, no tenderness to palpation at L buttock, lungs CTAB with good inspiratory effort, +S1S2, no m/r/g, abdomen soft with +BS, NT, +healing abdominal scar at RLQ and midline, ostomy in LLQ, with blue liquid (for smell reports patient), ostomy pink and moist with piece of stool, ND, no CVAT, moving all extremities; A/P: 58F with L sided buttock pain, will obtain labs, CT A/P, pain control, IVFs, reassess

## 2019-07-23 NOTE — ED PROVIDER NOTE - CARE PLAN
Principal Discharge DX:	Back pain  Goal:	Follow up with your PMD within 1-2 weeks of discharge.  Assessment and plan of treatment:	You presented with back pain. You were given morphine for pain management. CT abdomen and pelvis did not reveal any source of infection. Please continue to take baclofen (home medication). Please follow up with your PMD within 1-2 weeks of discharge.  Secondary Diagnosis:	Urinary tract infection

## 2019-07-23 NOTE — ED PROVIDER NOTE - OBJECTIVE STATEMENT
58F with gestational diabetes, lumbar disc disease s/p hardware placement 5011, left nephrectomy when she was 6 yo for congenital anomaly, HTN, cholecystectomy, diverticular perforation s/p exploratory laparotomy, L colectomy and Hartmanns on 5/30/19 presenting with right sided buttock pain. Symtoms started 4 days ago with 8/10 buttock pain with radiation to left hip. No fever, nausea/vomiting, no diarrhea

## 2019-07-23 NOTE — ED ADULT NURSE REASSESSMENT NOTE - NS ED NURSE REASSESS COMMENT FT1
Received report from Evette SERRANO. Patient resting comfortably in stretcher. A&Ox4. Vital signs are stable. Patient reports 4/10 left sided flank and back pain with movement and states the pain has decreased since receiving pain medications. Patient denies chest pain, abdominal pain, SOB, fever/chills at this time. Patient pending results of CT scan. Plan of care discussed. Safety and comfort measures maintained. Patient's  at bedside. Will continue to monitor.

## 2019-07-23 NOTE — ED PROVIDER NOTE - PHYSICAL EXAMINATION
GENERAL: Mild distress due to pain   HEAD:  Atraumatic, Normocephalic  EYES: EOMI, conjunctiva and sclera clear  NECK: Supple, No JVD  CHEST/LUNG: Clear to auscultation bilaterally; No wheeze  HEART: Regular rate and rhythm; No murmurs, rubs, or gallops  ABDOMEN: Soft, Nontender, Nondistended; Bowel sounds present  EXTREMITIES:  2+ Peripheral Pulses, No clubbing, cyanosis, or edema  PSYCH: AAOx3  NEUROLOGY: non-focal  SKIN: No rashes or lesions

## 2019-07-25 LAB
-  AMIKACIN: SIGNIFICANT CHANGE UP
-  AMIKACIN: SIGNIFICANT CHANGE UP
-  AMPICILLIN/SULBACTAM: SIGNIFICANT CHANGE UP
-  AMPICILLIN/SULBACTAM: SIGNIFICANT CHANGE UP
-  AMPICILLIN: SIGNIFICANT CHANGE UP
-  AMPICILLIN: SIGNIFICANT CHANGE UP
-  AZTREONAM: SIGNIFICANT CHANGE UP
-  AZTREONAM: SIGNIFICANT CHANGE UP
-  CEFEPIME: SIGNIFICANT CHANGE UP
-  CEFEPIME: SIGNIFICANT CHANGE UP
-  CEFOXITIN: SIGNIFICANT CHANGE UP
-  CEFOXITIN: SIGNIFICANT CHANGE UP
-  CEFTRIAXONE: SIGNIFICANT CHANGE UP
-  CEFTRIAXONE: SIGNIFICANT CHANGE UP
-  CIPROFLOXACIN: SIGNIFICANT CHANGE UP
-  CIPROFLOXACIN: SIGNIFICANT CHANGE UP
-  ERTAPENEM: SIGNIFICANT CHANGE UP
-  ERTAPENEM: SIGNIFICANT CHANGE UP
-  GENTAMICIN: SIGNIFICANT CHANGE UP
-  GENTAMICIN: SIGNIFICANT CHANGE UP
-  IMIPENEM: SIGNIFICANT CHANGE UP
-  IMIPENEM: SIGNIFICANT CHANGE UP
-  LEVOFLOXACIN: SIGNIFICANT CHANGE UP
-  LEVOFLOXACIN: SIGNIFICANT CHANGE UP
-  MEROPENEM: SIGNIFICANT CHANGE UP
-  MEROPENEM: SIGNIFICANT CHANGE UP
-  NITROFURANTOIN: SIGNIFICANT CHANGE UP
-  NITROFURANTOIN: SIGNIFICANT CHANGE UP
-  PIPERACILLIN/TAZOBACTAM: SIGNIFICANT CHANGE UP
-  PIPERACILLIN/TAZOBACTAM: SIGNIFICANT CHANGE UP
-  TIGECYCLINE: SIGNIFICANT CHANGE UP
-  TIGECYCLINE: SIGNIFICANT CHANGE UP
-  TOBRAMYCIN: SIGNIFICANT CHANGE UP
-  TOBRAMYCIN: SIGNIFICANT CHANGE UP
-  TRIMETHOPRIM/SULFAMETHOXAZOLE: SIGNIFICANT CHANGE UP
-  TRIMETHOPRIM/SULFAMETHOXAZOLE: SIGNIFICANT CHANGE UP
CULTURE RESULTS: SIGNIFICANT CHANGE UP
METHOD TYPE: SIGNIFICANT CHANGE UP
METHOD TYPE: SIGNIFICANT CHANGE UP
ORGANISM # SPEC MICROSCOPIC CNT: SIGNIFICANT CHANGE UP
SPECIMEN SOURCE: SIGNIFICANT CHANGE UP

## 2019-07-25 NOTE — ED POST DISCHARGE NOTE - RESULT SUMMARY
ucx prelim >100k ecoli on cefpodoxime will follow up final sens - Liz Cardona PA-C ucx prelim >100k gnr on cefpodoxime will follow up final sens - Liz Cardona PA-C 7/27/19: Ucx >100K K. pneumoniae, two strains both susceptible to 3rd generation cephalosporins, pt sent home on cefpodoxime, pt treated appropriately, no need for pt contact. -Whitney Castellanos PA-C

## 2019-08-08 ENCOUNTER — APPOINTMENT (OUTPATIENT)
Dept: SURGERY | Facility: CLINIC | Age: 59
End: 2019-08-08
Payer: COMMERCIAL

## 2019-08-08 VITALS
TEMPERATURE: 98 F | SYSTOLIC BLOOD PRESSURE: 156 MMHG | HEART RATE: 86 BPM | OXYGEN SATURATION: 100 % | RESPIRATION RATE: 16 BRPM | DIASTOLIC BLOOD PRESSURE: 101 MMHG

## 2019-08-08 PROCEDURE — 99213 OFFICE O/P EST LOW 20 MIN: CPT

## 2019-08-08 RX ORDER — IBUPROFEN 200 MG/1
TABLET, COATED ORAL
Refills: 0 | Status: DISCONTINUED | COMMUNITY
End: 2019-08-08

## 2019-08-08 RX ORDER — ALPRAZOLAM 0.25 MG/1
0.25 TABLET ORAL
Qty: 30 | Refills: 0 | Status: ACTIVE | COMMUNITY
Start: 2019-07-15

## 2019-08-08 RX ORDER — FENOFIBRATE 145 MG/1
145 TABLET, COATED ORAL
Refills: 0 | Status: ACTIVE | COMMUNITY

## 2019-08-08 RX ORDER — BACLOFEN 10 MG/1
10 TABLET ORAL
Qty: 60 | Refills: 0 | Status: DISCONTINUED | COMMUNITY
Start: 2019-07-06 | End: 2019-08-08

## 2019-08-08 RX ORDER — CEFPODOXIME PROXETIL 100 MG/1
100 TABLET, FILM COATED ORAL
Qty: 14 | Refills: 0 | Status: DISCONTINUED | COMMUNITY
Start: 2019-07-23

## 2019-08-08 NOTE — HISTORY OF PRESENT ILLNESS
[FreeTextEntry1] : Marcie is a 57 y/o female here for follow up visit regarding colostomy reversal. Patient is s/p exploratory laparotomy, left colectomy and Carolyn on 5/30/19 indicated for perforated diverticulitis. Her post-operative course was complicated by obstruction and was taken back to the OR for adhesiolysis on 6/19/19. Last seen on 7/9/19, remaining sutures and staples removed. Instructed to apply bacitracin and dry dressing to ulcerated wound from colostomy appliance. Patient will need a colonoscopy and sigmoidoscopy prior to surgery. \par \par CT from 7/23/19 indicated for severe abdominal pain and almost no ostomy output demonstrated no bowel obstruction or other acute abnormality. \par \par Pt feeling well today. Normal functioning ostomy. Denies pain. Energy slowly improving.

## 2019-08-08 NOTE — ASSESSMENT
[FreeTextEntry1] : Patient much improved. Will schedule colonoscopy in October. Patient has not had one in years. Indications, risks, benefits, alternatives reviewed including but not limited to bleeding, perforation, and incomplete exam. All questions answered. Would wait until December for ostomy closure. This will be 6 months from the last surgery. Patient at high risk for adhesions.

## 2019-08-08 NOTE — PHYSICAL EXAM
[Abdomen Masses] : No abdominal masses [Abdomen Tenderness] : ~T No ~M abdominal tenderness [de-identified] : Healed wounds, normal ostomy [FreeTextEntry1] : H

## 2019-10-08 ENCOUNTER — OTHER (OUTPATIENT)
Age: 59
End: 2019-10-08

## 2019-10-28 ENCOUNTER — OUTPATIENT (OUTPATIENT)
Dept: OUTPATIENT SERVICES | Facility: HOSPITAL | Age: 59
LOS: 1 days | End: 2019-10-28
Payer: COMMERCIAL

## 2019-10-28 ENCOUNTER — APPOINTMENT (OUTPATIENT)
Dept: SURGERY | Facility: HOSPITAL | Age: 59
End: 2019-10-28
Payer: COMMERCIAL

## 2019-10-28 DIAGNOSIS — Z93.3 COLOSTOMY STATUS: ICD-10-CM

## 2019-10-28 DIAGNOSIS — K57.32 DIVERTICULITIS OF LARGE INTESTINE WITHOUT PERFORATION OR ABSCESS WITHOUT BLEEDING: ICD-10-CM

## 2019-10-28 PROCEDURE — 45378 DIAGNOSTIC COLONOSCOPY: CPT

## 2019-10-28 PROCEDURE — 45330 DIAGNOSTIC SIGMOIDOSCOPY: CPT

## 2019-10-28 PROCEDURE — 44388 COLONOSCOPY THRU STOMA SPX: CPT

## 2019-11-04 ENCOUNTER — APPOINTMENT (OUTPATIENT)
Dept: ORTHOPEDIC SURGERY | Facility: CLINIC | Age: 59
End: 2019-11-04
Payer: COMMERCIAL

## 2019-11-04 VITALS
DIASTOLIC BLOOD PRESSURE: 98 MMHG | BODY MASS INDEX: 24.74 KG/M2 | SYSTOLIC BLOOD PRESSURE: 164 MMHG | HEIGHT: 60 IN | WEIGHT: 126 LBS | HEART RATE: 96 BPM

## 2019-11-04 DIAGNOSIS — M25.562 PAIN IN RIGHT KNEE: ICD-10-CM

## 2019-11-04 DIAGNOSIS — Z96.642 PRESENCE OF LEFT ARTIFICIAL HIP JOINT: ICD-10-CM

## 2019-11-04 DIAGNOSIS — M54.5 LOW BACK PAIN: ICD-10-CM

## 2019-11-04 DIAGNOSIS — M25.561 PAIN IN RIGHT KNEE: ICD-10-CM

## 2019-11-04 DIAGNOSIS — Z96.641 PRESENCE OF RIGHT ARTIFICIAL HIP JOINT: ICD-10-CM

## 2019-11-04 PROCEDURE — 73564 X-RAY EXAM KNEE 4 OR MORE: CPT | Mod: RT

## 2019-11-04 PROCEDURE — 72100 X-RAY EXAM L-S SPINE 2/3 VWS: CPT

## 2019-11-04 PROCEDURE — 99204 OFFICE O/P NEW MOD 45 MIN: CPT

## 2019-11-04 PROCEDURE — 73522 X-RAY EXAM HIPS BI 3-4 VIEWS: CPT

## 2019-11-04 RX ORDER — DICLOFENAC SODIUM 10 MG/G
1 GEL TOPICAL DAILY
Qty: 1 | Refills: 0 | Status: ACTIVE | COMMUNITY
Start: 2019-11-04 | End: 1900-01-01

## 2019-11-04 NOTE — PHYSICAL EXAM
[de-identified] : Physical examination constitutional - the patient is of normal build and nutrition.  The patient remains oriented to person, time, and  place.  Mood is normal. Vital signs as recorded.  The patients gait is WNL.  He does have pain radiating into her thighs however bilaterally toward her knees but not actually with knee motion or in her knee.  The patient has satisfactory  balance and can stand on toes and heels.\par \par The patient has no difficulty with respiration. Respiration at rest is a normal rate. The patient is not short of breath and has not become short of breath with short ambulation. There is no audible wheezing. No coughing.\par \par Skin is normal for the patient's age. There are no abnormal masses or lymph nodes which stand out in the lower extremities.\par \par Spine - deep tendon reflexes are symmetric.  Symptoms that she is having now appear to be much more related to her back then to her knees or thighs.  She does have bilateral total hip replacements done by Dr. Perkins but her pain is not in the groin.\par \par \par UPPER EXTREMITIES \par \par Shoulders ROM  is symmetric  and the motion is satisfactory.  There is no significant shoulder pain or limitation in motion which would make using a cane or a walker difficult. Shoulder stability and  strength are satisfactory.\par \par Circulation appears satisfactory with pedal pulses present.  There is no major edema in the lower legs. No skin tenderness or increased temperature. No major varicosities.\par \par HIP EXAMINATION the abduction and abduction as well as rotation measurements were taken with the hip in flexion.\par \par Motion\par He has bilateral total hip replacements with flexion right 115 degrees left 130 degrees,   remainder of her motion is symmetric  abduction 75 degrees adduction 20 degrees external rotation 75 degrees and internal rotation 20 degrees.\par The hips have good range of motion. There is good strength across the hips. There is no crepitus in either hip. The alignment of the hips is normal.\par \par \par KNEE EXAMINATION\par \par Motion\par Right Knee has 0 to 135 degrees of motion with good medial  lateral and anterior posterior stability.  There is no major effusion.  There is no Baker's cyst.  There is no significant patellofemoral crepitus.  She has satisfactory strength across her knees.               \par Left  Knee   has 0 to 135 degrees of motion with good medial lateral and anterior posterior stability.  There is no major effusion there is no Baker's cyst.  There is no significant patellofemoral crepitus.  She has satisfactory strength across her knees.            \par \par Ankle and foot examination\par Of the ankle has normal motion.  There is normal ankle stability.  The patient has no major abnormalities of the foot.\par \par \par \par  [de-identified] : This patient had 4 views done of the right and left knees.  On the standing and Jamison views joint spaces are maintained on the skyline views her tracking is more off the lateral facet but no major arthritis.  And she has mild degenerative changes consistent with her age.\par \par \par AP of her pelvis and a lateral of her right left hip show bilateral old porous total hip replacements in good position and well fixed she does have pedicle screws going from L4-L5 and L5 to the sacrum.\par \par Her back at this time does show the instrumentation from L4 to sacrum and above that on the AP view her alignment is 3.  Her lateral view also shows a general alignment above the L4 level satisfactory foramen appear to be open.

## 2019-11-04 NOTE — DISCUSSION/SUMMARY
[de-identified] : Patient's present problem does not appear to be due to her knees she gets radiating pain into her thighs which may well be due to her lumbar spine.  At this time although her neurologic is normal I feel she should be evaluated by neurology and she is referred to them if they feel that surgical consult should be done and she will be seen by orthopedic spine.  This was discussed with the patient no anti-inflammatory medications were given because of her really because of her colostomy.

## 2019-11-04 NOTE — HISTORY OF PRESENT ILLNESS
[de-identified] : Ms. ALFONZO BARBOUR is a 59 year female who presents to office complaining of bilateral knee pain.\par Patient has had pain since February 2019 since exercising in an attempt to lose weight.\par She has also had some medical issues related to diverticulitis and sepsis over the summer.\par Her knee pains have worsened since the summer in doing more walking.\par She has not taken any medicine for the pain as she has her GI problems along with a colostomy bag. She is also scheduled for another bowel surgery next month, and doesn't want to ingest any medication that would impact that. \par All review of systems, family history, social history, surgical history, past medical history, medications, and allergies not previously stated as positive are negative. They were reviewed by me today with the patient and documented accordingly.

## 2019-11-21 ENCOUNTER — APPOINTMENT (OUTPATIENT)
Dept: SURGERY | Facility: CLINIC | Age: 59
End: 2019-11-21
Payer: COMMERCIAL

## 2019-11-21 VITALS
DIASTOLIC BLOOD PRESSURE: 93 MMHG | HEART RATE: 88 BPM | RESPIRATION RATE: 16 BRPM | TEMPERATURE: 97.8 F | OXYGEN SATURATION: 98 % | SYSTOLIC BLOOD PRESSURE: 153 MMHG

## 2019-11-21 PROCEDURE — 99214 OFFICE O/P EST MOD 30 MIN: CPT

## 2019-11-21 NOTE — HISTORY OF PRESENT ILLNESS
[FreeTextEntry1] : Marcie is a 58 y/o female here for follow up visit. Patient is s/p exploratory laparotomy, left colectomy and Carolyn on 5/30/19 indicated for perforated diverticulitis (In [place]). Her post-operative course was complicated by obstruction and was taken back to the OR for adhesiolysis on 6/19/19. Last seen on 8/8/19, patient much improved. Colonoscopy recommended. Colonoscopy from 10/28/19 demonstrated multiple small and large- mouthed diverticula found in the transverse colon and in the ascending colon.

## 2019-11-21 NOTE — PHYSICAL EXAM
[Abdomen Masses] : No abdominal masses [Abdomen Tenderness] : ~T No ~M abdominal tenderness [de-identified] : Healed wound, normal ostomy

## 2019-11-21 NOTE — ASSESSMENT
[FreeTextEntry1] : Patient with previous left nephrectomy. CT scan reviewed. Small bowel likely adherent to right pelvic sidewall. Since patient perforated diverticulitis with peritonitis and subsequent emergency reoperation for adhesions I recommended a right ureteral catheter to protect the ureter during what may be a difficult pelvic dissection. Indications, risks, benefits, alternatives for closure of Carolyn reviewed including but not limited to bleeding, infection, anastomotic leak, prolonged surgery, postoperative ileus, and permanent change in bowel habits. All questions were answered.

## 2019-11-29 ENCOUNTER — OUTPATIENT (OUTPATIENT)
Dept: OUTPATIENT SERVICES | Facility: HOSPITAL | Age: 59
LOS: 1 days | End: 2019-11-29
Payer: COMMERCIAL

## 2019-11-29 VITALS
OXYGEN SATURATION: 98 % | DIASTOLIC BLOOD PRESSURE: 86 MMHG | HEIGHT: 60 IN | TEMPERATURE: 99 F | RESPIRATION RATE: 16 BRPM | WEIGHT: 130.95 LBS | SYSTOLIC BLOOD PRESSURE: 143 MMHG | HEART RATE: 78 BPM

## 2019-11-29 DIAGNOSIS — Z57.39 OCCUPATIONAL EXPOSURE TO OTHER AIR CONTAMINANTS: ICD-10-CM

## 2019-11-29 DIAGNOSIS — Z29.9 ENCOUNTER FOR PROPHYLACTIC MEASURES, UNSPECIFIED: ICD-10-CM

## 2019-11-29 DIAGNOSIS — Z01.818 ENCOUNTER FOR OTHER PREPROCEDURAL EXAMINATION: ICD-10-CM

## 2019-11-29 DIAGNOSIS — Z93.3 COLOSTOMY STATUS: ICD-10-CM

## 2019-11-29 DIAGNOSIS — Z98.1 ARTHRODESIS STATUS: Chronic | ICD-10-CM

## 2019-11-29 DIAGNOSIS — I10 ESSENTIAL (PRIMARY) HYPERTENSION: ICD-10-CM

## 2019-11-29 DIAGNOSIS — Z93.3 COLOSTOMY STATUS: Chronic | ICD-10-CM

## 2019-11-29 LAB
ANION GAP SERPL CALC-SCNC: 15 MMOL/L — SIGNIFICANT CHANGE UP (ref 5–17)
BLD GP AB SCN SERPL QL: NEGATIVE — SIGNIFICANT CHANGE UP
BUN SERPL-MCNC: 22 MG/DL — SIGNIFICANT CHANGE UP (ref 7–23)
CALCIUM SERPL-MCNC: 10 MG/DL — SIGNIFICANT CHANGE UP (ref 8.4–10.5)
CHLORIDE SERPL-SCNC: 98 MMOL/L — SIGNIFICANT CHANGE UP (ref 96–108)
CO2 SERPL-SCNC: 25 MMOL/L — SIGNIFICANT CHANGE UP (ref 22–31)
CREAT SERPL-MCNC: 0.74 MG/DL — SIGNIFICANT CHANGE UP (ref 0.5–1.3)
GLUCOSE SERPL-MCNC: 100 MG/DL — HIGH (ref 70–99)
HBA1C BLD-MCNC: 6.3 % — HIGH (ref 4–5.6)
HCT VFR BLD CALC: 43.7 % — SIGNIFICANT CHANGE UP (ref 34.5–45)
HGB BLD-MCNC: 13.7 G/DL — SIGNIFICANT CHANGE UP (ref 11.5–15.5)
MCHC RBC-ENTMCNC: 27.6 PG — SIGNIFICANT CHANGE UP (ref 27–34)
MCHC RBC-ENTMCNC: 31.4 GM/DL — LOW (ref 32–36)
MCV RBC AUTO: 88.1 FL — SIGNIFICANT CHANGE UP (ref 80–100)
PLATELET # BLD AUTO: 253 K/UL — SIGNIFICANT CHANGE UP (ref 150–400)
POTASSIUM SERPL-MCNC: 4.1 MMOL/L — SIGNIFICANT CHANGE UP (ref 3.5–5.3)
POTASSIUM SERPL-SCNC: 4.1 MMOL/L — SIGNIFICANT CHANGE UP (ref 3.5–5.3)
RBC # BLD: 4.96 M/UL — SIGNIFICANT CHANGE UP (ref 3.8–5.2)
RBC # FLD: 13.4 % — SIGNIFICANT CHANGE UP (ref 10.3–14.5)
RH IG SCN BLD-IMP: NEGATIVE — SIGNIFICANT CHANGE UP
SODIUM SERPL-SCNC: 138 MMOL/L — SIGNIFICANT CHANGE UP (ref 135–145)
WBC # BLD: 9.51 K/UL — SIGNIFICANT CHANGE UP (ref 3.8–10.5)
WBC # FLD AUTO: 9.51 K/UL — SIGNIFICANT CHANGE UP (ref 3.8–10.5)

## 2019-11-29 PROCEDURE — 86900 BLOOD TYPING SEROLOGIC ABO: CPT

## 2019-11-29 PROCEDURE — 87086 URINE CULTURE/COLONY COUNT: CPT

## 2019-11-29 PROCEDURE — 83036 HEMOGLOBIN GLYCOSYLATED A1C: CPT

## 2019-11-29 PROCEDURE — G0463: CPT

## 2019-11-29 PROCEDURE — 86901 BLOOD TYPING SEROLOGIC RH(D): CPT

## 2019-11-29 PROCEDURE — 86850 RBC ANTIBODY SCREEN: CPT

## 2019-11-29 PROCEDURE — 80048 BASIC METABOLIC PNL TOTAL CA: CPT

## 2019-11-29 PROCEDURE — 85027 COMPLETE CBC AUTOMATED: CPT

## 2019-11-29 RX ORDER — CEFOTETAN DISODIUM 1 G
2 VIAL (EA) INJECTION ONCE
Refills: 0 | Status: DISCONTINUED | OUTPATIENT
Start: 2019-12-10 | End: 2019-12-11

## 2019-11-29 NOTE — H&P PST ADULT - NEGATIVE GENERAL SYMPTOMS
no sweating/no anorexia/no weight loss/no chills/no fever Pt to be seen for ~ 2 more visits to ensure independence with amb, transfers, and mobility on stairs.

## 2019-11-29 NOTE — H&P PST ADULT - ASSESSMENT
DKI VTE 2.0 SCORE [CLOT updated 2019]    AGE RELATED RISK FACTORS                                                       MOBILITY RELATED FACTORS  [x ] Age 41-60 years                                            (1 Point)                    [ ] Bed rest                                                        (1 Point)  [ ] Age: 61-74 years                                           (2 Points)                  [ ] Plaster cast                                                   (2 Points)  [ ] Age= 75 years                                              (3 Points)                    [ ] Bed bound for more than 72 hours                 (2 Points)    DISEASE RELATED RISK FACTORS                                               GENDER SPECIFIC FACTORS  [ ] Edema in the lower extremities                       (1 Point)              [ ] Pregnancy                                                     (1 Point)  [ ] Varicose veins                                               (1 Point)                     [ ] Post-partum < 6 weeks                                   (1 Point)             [x ] BMI > 25 Kg/m2                                            (1 Point)                     [ ] Hormonal therapy  or oral contraception          (1 Point)                 [ ] Sepsis (in the previous month)                        (1 Point)               [ ] History of pregnancy complications                 (1 point)  [ ] Pneumonia or serious lung disease                                               [ ] Unexplained or recurrent                     (1 Point)           (in the previous month)                               (1 Point)  [ ] Abnormal pulmonary function test                     (1 Point)                 SURGERY RELATED RISK FACTORS  [ ] Acute myocardial infarction                              (1 Point)               [ ]  Section                                             (1 Point)  [ ] Congestive heart failure (in the previous month)  (1 Point)      [ ] Minor surgery                                                  (1 Point)   [ ] Inflammatory bowel disease                             (1 Point)               [ ] Arthroscopic surgery                                        (2 Points)  [ ] Central venous access                                      (2 Points)                [ x] General surgery lasting more than 45 minutes (2 points)  [ ] Malignancy- Present or previous                   (2 Points)                [ ] Elective arthroplasty                                         (5 points)    [ ] Stroke (in the previous month)                          (5 Points)                                                                                                                                                           HEMATOLOGY RELATED FACTORS                                                 TRAUMA RELATED RISK FACTORS  [ ] Prior episodes of VTE                                     (3 Points)                [ ] Fracture of the hip, pelvis, or leg                       (5 Points)  [ ] Positive family history for VTE                         (3 Points)             [ ] Acute spinal cord injury (in the previous month)  (5 Points)  [ ] Prothrombin 84601 A                                     (3 Points)               [ ] Paralysis  (less than 1 month)                             (5 Points)  [ ] Factor V Leiden                                             (3 Points)                  [ ] Multiple Trauma within 1 month                        (5 Points)  [ ] Lupus anticoagulants                                     (3 Points)                                                           [ ] Anticardiolipin antibodies                               (3 Points)                                                       [ ] High homocysteine in the blood                      (3 Points)                                             [ ] Other congenital or acquired thrombophilia      (3 Points)                                                [ ] Heparin induced thrombocytopenia                  (3 Points)                                     Total Score [   4       ]

## 2019-11-29 NOTE — H&P PST ADULT - HISTORY OF PRESENT ILLNESS
Ms. 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 She states that [place] end of [place] when she began to have abdominal distention and severe pain. [facility] where she was found to have perforated diverticulitis with multiple perforations of the L colon, for which she underwent an exploratory laparotomy, L colectomy and Hartmanns on 5/30/19. Her hospital course was complicated by obstruction for which she underwent return to OR for adhesiolysis on 6/19/19. Refeeding started 6/23/19, abdominal drains removed 6/24/19, and bladder catheter removed 6/25/19. Patient was on Meropenem, vancomycin, metronidazole and caspofungin. Pt was transferred to [facility] 7/2/19 for followup.  Seen by MD. Presents for ERAS , Open closure of halley, cystoscopy insertion of ureteral catheters  12/10/19. Ms. 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 She states that she was on an [place] at the end of May when she began to have abdominal distention and severe pain. She was taken to an [facility] where she was found to have perforated diverticulitis with multiple perforations of the L colon, for which she underwent an exploratory laparotomy, L colectomy and Hartmanns on 5/30/19. Her hospital course was complicated by obstruction for which she underwent return to OR for adhesiolysis on 6/19/19. Refeeding started 6/23/19, abdominal drains removed 6/24/19, and bladder catheter removed 6/25/19. Patient was on Meropenem, vancomycin, metronidazole and caspofungin. Pt was transferred to [facility] via [facility] 7/2/19 for followup.  Seen by Dr Navarro.   Presents for ERAS , Open closure of Carolyn, cystoscopy insertion of ureteral catheters  12/10/19.

## 2019-11-29 NOTE — H&P PST ADULT - NSICDXPASTMEDICALHX_GEN_ALL_CORE_FT
PAST MEDICAL HISTORY:  History of Gestational Diabetes     History of Goiter 20 years ago, was on synthroid, resolved self    History of Pneumonia     HTN (Hypertension)     Lumbar Disc Disease PAST MEDICAL HISTORY:  Colon obstruction     Congenital vesico-uretero-renal reflux     Diverticulitis perforated    History of Gestational Diabetes     History of Goiter 20 years ago, was on synthroid, resolved self    History of Pneumonia     HTN (Hypertension)     Lumbago     Lumbar disc disease     Lumbar Disc Disease     OA (osteoarthritis)

## 2019-11-29 NOTE — H&P PST ADULT - NSICDXPASTSURGICALHX_GEN_ALL_CORE_FT
PAST SURGICAL HISTORY:  Congenital Anomaly of Kidney left kidney, removed at 5 year old    S/P  Section 3 c-sections    S/P Cholecystectomy 20 years ago PAST SURGICAL HISTORY:  Congenital Anomaly of Kidney left kidney, removed at 5 year old    S/P  Section 3 c-sections    S/P Cholecystectomy 20 years ago    Status post Carolyn procedure 19 Colostomy left lower quadrant, sx for bowel obstruction 2019 PAST SURGICAL HISTORY:  Congenital Anomaly of Kidney left kidney, removed at 5 year old    History of lumbar fusion     S/P  Section 3 c-sections    S/P Cholecystectomy 20 years ago    Status post Carolyn procedure 19 Colostomy left lower quadrant, Taken back to OR for adhesiolysis 19

## 2019-11-29 NOTE — H&P PST ADULT - NSICDXPROBLEM_GEN_ALL_CORE_FT
PROBLEM DIAGNOSES  Problem: Colostomy status  Assessment and Plan: ERAS Open closure of Carolyn, Cystoscopy insertion of ureteral catheters 12/10/19  Check CBC, BMP , HgBA1C , T&S      Problem: Prophylactic measure  Assessment and Plan: The Caprini score indicates this patient is at risk for a VTE event (score 3-5).  Most surgical patients in this group would benefit from pharmacologic prophylaxis.  The surgical team will determine the balance between VTE risk and bleeding risk      Problem: Benign essential HTN  Assessment and Plan: Continue meds as ordered

## 2019-11-30 LAB
CULTURE RESULTS: SIGNIFICANT CHANGE UP
SPECIMEN SOURCE: SIGNIFICANT CHANGE UP

## 2019-12-02 DIAGNOSIS — Z87.440 PERSONAL HISTORY OF URINARY (TRACT) INFECTIONS: ICD-10-CM

## 2019-12-06 NOTE — H&P ADULT - SKIN
12/6/2019      Apolonia Gutierrez       [address] [facility] [address]        Dear Apolonia,     We have been reviewing your records and noted that you are overdue for a Complete Physical Exam with pap smear.  Please call my office at your earliest convenience to schedule this. When making the appointment please let them know that it is for a complete exam with pap .      Thank you,          Chino Holder M.D.  [id number] [place]  [address] [phone]    No lesions; no rash

## 2019-12-09 ENCOUNTER — TRANSCRIPTION ENCOUNTER (OUTPATIENT)
Age: 59
End: 2019-12-09

## 2019-12-10 ENCOUNTER — INPATIENT (INPATIENT)
Facility: HOSPITAL | Age: 59
LOS: 4 days | Discharge: HOME | DRG: 330 | End: 2019-12-15
Payer: COMMERCIAL

## 2019-12-10 ENCOUNTER — APPOINTMENT (OUTPATIENT)
Dept: SURGERY | Facility: HOSPITAL | Age: 59
End: 2019-12-10
Payer: COMMERCIAL

## 2019-12-10 ENCOUNTER — RESULT REVIEW (OUTPATIENT)
Age: 59
End: 2019-12-10

## 2019-12-10 VITALS
OXYGEN SATURATION: 100 % | DIASTOLIC BLOOD PRESSURE: 91 MMHG | TEMPERATURE: 98 F | SYSTOLIC BLOOD PRESSURE: 143 MMHG | HEART RATE: 87 BPM | HEIGHT: 60 IN | WEIGHT: 130.95 LBS | RESPIRATION RATE: 20 BRPM

## 2019-12-10 DIAGNOSIS — Z98.1 ARTHRODESIS STATUS: Chronic | ICD-10-CM

## 2019-12-10 DIAGNOSIS — Z93.3 COLOSTOMY STATUS: ICD-10-CM

## 2019-12-10 DIAGNOSIS — Z93.3 COLOSTOMY STATUS: Chronic | ICD-10-CM

## 2019-12-10 DIAGNOSIS — Z57.39 OCCUPATIONAL EXPOSURE TO OTHER AIR CONTAMINANTS: ICD-10-CM

## 2019-12-10 LAB
ANION GAP SERPL CALC-SCNC: 15 MMOL/L — SIGNIFICANT CHANGE UP (ref 5–17)
BUN SERPL-MCNC: 19 MG/DL — SIGNIFICANT CHANGE UP (ref 7–23)
CALCIUM SERPL-MCNC: 7.5 MG/DL — LOW (ref 8.4–10.5)
CHLORIDE SERPL-SCNC: 98 MMOL/L — SIGNIFICANT CHANGE UP (ref 96–108)
CO2 SERPL-SCNC: 21 MMOL/L — LOW (ref 22–31)
CREAT SERPL-MCNC: 0.98 MG/DL — SIGNIFICANT CHANGE UP (ref 0.5–1.3)
GLUCOSE BLDC GLUCOMTR-MCNC: 248 MG/DL — HIGH (ref 70–99)
GLUCOSE BLDC GLUCOMTR-MCNC: 251 MG/DL — HIGH (ref 70–99)
GLUCOSE BLDC GLUCOMTR-MCNC: 88 MG/DL — SIGNIFICANT CHANGE UP (ref 70–99)
GLUCOSE BLDC GLUCOMTR-MCNC: 99 MG/DL — SIGNIFICANT CHANGE UP (ref 70–99)
GLUCOSE SERPL-MCNC: 187 MG/DL — HIGH (ref 70–99)
HCT VFR BLD CALC: 39.8 % — SIGNIFICANT CHANGE UP (ref 34.5–45)
HGB BLD-MCNC: 12.5 G/DL — SIGNIFICANT CHANGE UP (ref 11.5–15.5)
MAGNESIUM SERPL-MCNC: 2.1 MG/DL — SIGNIFICANT CHANGE UP (ref 1.6–2.6)
MCHC RBC-ENTMCNC: 27.5 PG — SIGNIFICANT CHANGE UP (ref 27–34)
MCHC RBC-ENTMCNC: 31.4 GM/DL — LOW (ref 32–36)
MCV RBC AUTO: 87.5 FL — SIGNIFICANT CHANGE UP (ref 80–100)
NRBC # BLD: 0 /100 WBCS — SIGNIFICANT CHANGE UP (ref 0–0)
PHOSPHATE SERPL-MCNC: 3.8 MG/DL — SIGNIFICANT CHANGE UP (ref 2.5–4.5)
PLATELET # BLD AUTO: 202 K/UL — SIGNIFICANT CHANGE UP (ref 150–400)
POTASSIUM SERPL-MCNC: 4.8 MMOL/L — SIGNIFICANT CHANGE UP (ref 3.5–5.3)
POTASSIUM SERPL-SCNC: 4.8 MMOL/L — SIGNIFICANT CHANGE UP (ref 3.5–5.3)
RBC # BLD: 4.55 M/UL — SIGNIFICANT CHANGE UP (ref 3.8–5.2)
RBC # FLD: 13.8 % — SIGNIFICANT CHANGE UP (ref 10.3–14.5)
SODIUM SERPL-SCNC: 134 MMOL/L — LOW (ref 135–145)
WBC # BLD: 11.18 K/UL — HIGH (ref 3.8–10.5)
WBC # FLD AUTO: 11.18 K/UL — HIGH (ref 3.8–10.5)

## 2019-12-10 PROCEDURE — 44310 ILEOSTOMY/JEJUNOSTOMY: CPT

## 2019-12-10 PROCEDURE — 88304 TISSUE EXAM BY PATHOLOGIST: CPT | Mod: 26

## 2019-12-10 PROCEDURE — 88302 TISSUE EXAM BY PATHOLOGIST: CPT | Mod: 26

## 2019-12-10 PROCEDURE — 44626 REPAIR BOWEL OPENING: CPT

## 2019-12-10 PROCEDURE — 44955 APPENDECTOMY ADD-ON: CPT

## 2019-12-10 PROCEDURE — 44602 SUTURE SMALL INTESTINE: CPT | Mod: 52,59

## 2019-12-10 RX ORDER — NALOXONE HYDROCHLORIDE 4 MG/.1ML
0.1 SPRAY NASAL
Refills: 0 | Status: DISCONTINUED | OUTPATIENT
Start: 2019-12-10 | End: 2019-12-13

## 2019-12-10 RX ORDER — GABAPENTIN 400 MG/1
600 CAPSULE ORAL ONCE
Refills: 0 | Status: COMPLETED | OUTPATIENT
Start: 2019-12-10 | End: 2019-12-10

## 2019-12-10 RX ORDER — ACETAMINOPHEN 500 MG
1000 TABLET ORAL EVERY 6 HOURS
Refills: 0 | Status: DISCONTINUED | OUTPATIENT
Start: 2019-12-10 | End: 2019-12-12

## 2019-12-10 RX ORDER — LIDOCAINE HCL 20 MG/ML
0.2 VIAL (ML) INJECTION ONCE
Refills: 0 | Status: DISCONTINUED | OUTPATIENT
Start: 2019-12-10 | End: 2019-12-10

## 2019-12-10 RX ORDER — ONDANSETRON 8 MG/1
4 TABLET, FILM COATED ORAL EVERY 6 HOURS
Refills: 0 | Status: DISCONTINUED | OUTPATIENT
Start: 2019-12-10 | End: 2019-12-13

## 2019-12-10 RX ORDER — ONDANSETRON 8 MG/1
4 TABLET, FILM COATED ORAL ONCE
Refills: 0 | Status: DISCONTINUED | OUTPATIENT
Start: 2019-12-10 | End: 2019-12-11

## 2019-12-10 RX ORDER — HEPARIN SODIUM 5000 [USP'U]/ML
5000 INJECTION INTRAVENOUS; SUBCUTANEOUS EVERY 8 HOURS
Refills: 0 | Status: DISCONTINUED | OUTPATIENT
Start: 2019-12-10 | End: 2019-12-15

## 2019-12-10 RX ORDER — CHLORHEXIDINE GLUCONATE 213 G/1000ML
1 SOLUTION TOPICAL ONCE
Refills: 0 | Status: DISCONTINUED | OUTPATIENT
Start: 2019-12-10 | End: 2019-12-10

## 2019-12-10 RX ORDER — SODIUM CHLORIDE 9 MG/ML
3 INJECTION INTRAMUSCULAR; INTRAVENOUS; SUBCUTANEOUS EVERY 8 HOURS
Refills: 0 | Status: DISCONTINUED | OUTPATIENT
Start: 2019-12-10 | End: 2019-12-10

## 2019-12-10 RX ORDER — INFLUENZA VIRUS VACCINE 15; 15; 15; 15 UG/.5ML; UG/.5ML; UG/.5ML; UG/.5ML
0.5 SUSPENSION INTRAMUSCULAR ONCE
Refills: 0 | Status: COMPLETED | OUTPATIENT
Start: 2019-12-10 | End: 2019-12-14

## 2019-12-10 RX ORDER — SODIUM CHLORIDE 9 MG/ML
1000 INJECTION, SOLUTION INTRAVENOUS
Refills: 0 | Status: DISCONTINUED | OUTPATIENT
Start: 2019-12-10 | End: 2019-12-11

## 2019-12-10 RX ORDER — HYDROMORPHONE HYDROCHLORIDE 2 MG/ML
0.5 INJECTION INTRAMUSCULAR; INTRAVENOUS; SUBCUTANEOUS
Refills: 0 | Status: DISCONTINUED | OUTPATIENT
Start: 2019-12-10 | End: 2019-12-11

## 2019-12-10 RX ORDER — CELECOXIB 200 MG/1
400 CAPSULE ORAL ONCE
Refills: 0 | Status: COMPLETED | OUTPATIENT
Start: 2019-12-10 | End: 2019-12-10

## 2019-12-10 RX ADMIN — CELECOXIB 400 MILLIGRAM(S): 200 CAPSULE ORAL at 07:16

## 2019-12-10 RX ADMIN — GABAPENTIN 600 MILLIGRAM(S): 400 CAPSULE ORAL at 07:16

## 2019-12-10 NOTE — BRIEF OPERATIVE NOTE - NSICDXBRIEFPROCEDURE_GEN_ALL_CORE_FT
PROCEDURES:  Open loop ileostomy 10-Dec-2019 16:34:18  Dada Arroyo  Reversal, Carolyn procedure 10-Dec-2019 16:34:07  Dada Arroyo

## 2019-12-10 NOTE — CHART NOTE - NSCHARTNOTEFT_GEN_A_CORE
POST-OPERATIVE NOTE    Subjective:  Patient is s/p Carolyn reversal carroll, and creation of loop ileostomy . Recovering appropriately. Pain well controlled. denies n/v . Ileostomy with dark liquid output. NGT in place with bilious output. Wen with expected red tinged urine. Dressing saturated and intact    Vital Signs Last 24 Hrs  T(C): 36 (10 Dec 2019 19:30), Max: 36.5 (10 Dec 2019 06:59)  T(F): 96.8 (10 Dec 2019 19:30), Max: 97.7 (10 Dec 2019 06:59)  HR: 79 (10 Dec 2019 19:30) (66 - 87)  BP: 127/76 (10 Dec 2019 19:30) (97/62 - 143/91)  BP(mean): 97 (10 Dec 2019 19:30) (74 - 97)  RR: 18 (10 Dec 2019 19:30) (16 - 20)  SpO2: 100% (10 Dec 2019 19:30) (99% - 100%)  I&O's Detail    10 Dec 2019 07:01  -  10 Dec 2019 20:39  --------------------------------------------------------  IN:    lactated ringers.: 200 mL  Total IN: 200 mL    OUT:    Indwelling Catheter - Urethral: 210 mL    Nasoenteral Tube: 100 mL  Total OUT: 310 mL    Total NET: -110 mL        cefoTEtan  IVPB 2  cefoTEtan  IVPB 2  heparin  Injectable 5000    PAST MEDICAL & SURGICAL HISTORY:  Colon obstruction  Congenital vesico-uretero-renal reflux  Lumbar disc disease  Lumbago  OA (osteoarthritis)  Diverticulitis: perforated  Lumbar Disc Disease  History of Gestational Diabetes  History of Pneumonia  History of Goiter: 20 years ago, was on synthroid, resolved self  HTN (Hypertension)  History of lumbar fusion:   Status post Carolyn procedure: 19 Colostomy left lower quadrant, Taken back to OR for adhesiolysis 19  S/P Cholecystectomy: 20 years ago  Congenital Anomaly of Kidney: left kidney, removed at 5 year old  S/P  Section: 3 c-sections        Physical Exam:  General: NAD, resting comfortably in bed  Pulmonary: Nonlabored breathing, no respiratory distress  Cardiovascular: NSR  Abdominal: soft, NT/ND, NGT, ileostomy with dark liquid output  : Wen  Extremities: WWP      LABS:                        12.5   11.18 )-----------( 202      ( 10 Dec 2019 17:11 )             39.8     12-10    134<L>  |  98  |  19  ----------------------------<  187<H>  4.8   |  21<L>  |  0.98    Ca    7.5<L>      10 Dec 2019 17:11  Phos  3.8     12-10  Mg     2.1     12-10        CAPILLARY BLOOD GLUCOSE      POCT Blood Glucose.: 99 mg/dL (10 Dec 2019 11:36)  POCT Blood Glucose.: 88 mg/dL (10 Dec 2019 10:33)  POCT Blood Glucose.: 251 mg/dL (10 Dec 2019 06:51)  POCT Blood Glucose.: 248 mg/dL (10 Dec 2019 06:50)      Radiology and Additional Studies:    Assessment:  The patient is a 59y Female who is now several hours post-op from a Carolyn reversal extensive CARROLL, and creation of loop ileostomy    Plan:  - Pain control as needed- no toradol  - NPO/ IVF  - DVT ppx  - OOB and ambulating as tolerated  - F/u AM labs POST-OPERATIVE NOTE    Subjective:  Patient is s/p Carolyn reversal carroll, and creation of loop ileostomy . Recovering appropriately. Pain well controlled with PCEA. denies n/v . Ileostomy with dark liquid output. NGT in place with bilious output. Wen with expected red tinged urine. Dressing saturated and intact    Vital Signs Last 24 Hrs  T(C): 36 (10 Dec 2019 19:30), Max: 36.5 (10 Dec 2019 06:59)  T(F): 96.8 (10 Dec 2019 19:30), Max: 97.7 (10 Dec 2019 06:59)  HR: 79 (10 Dec 2019 19:30) (66 - 87)  BP: 127/76 (10 Dec 2019 19:30) (97/62 - 143/91)  BP(mean): 97 (10 Dec 2019 19:30) (74 - 97)  RR: 18 (10 Dec 2019 19:30) (16 - 20)  SpO2: 100% (10 Dec 2019 19:30) (99% - 100%)  I&O's Detail    10 Dec 2019 07:01  -  10 Dec 2019 20:39  --------------------------------------------------------  IN:    lactated ringers.: 200 mL  Total IN: 200 mL    OUT:    Indwelling Catheter - Urethral: 210 mL    Nasoenteral Tube: 100 mL  Total OUT: 310 mL    Total NET: -110 mL        cefoTEtan  IVPB 2  cefoTEtan  IVPB 2  heparin  Injectable 5000    PAST MEDICAL & SURGICAL HISTORY:  Colon obstruction  Congenital vesico-uretero-renal reflux  Lumbar disc disease  Lumbago  OA (osteoarthritis)  Diverticulitis: perforated  Lumbar Disc Disease  History of Gestational Diabetes  History of Pneumonia  History of Goiter: 20 years ago, was on synthroid, resolved self  HTN (Hypertension)  History of lumbar fusion:   Status post Carolyn procedure: 19 Colostomy left lower quadrant, Taken back to OR for adhesiolysis 19  S/P Cholecystectomy: 20 years ago  Congenital Anomaly of Kidney: left kidney, removed at 5 year old  S/P  Section: 3 c-sections        Physical Exam:  General: NAD, resting comfortably in bed  Pulmonary: Nonlabored breathing, no respiratory distress  Cardiovascular: NSR  Abdominal: soft, NT/ND, NGT, ileostomy with dark liquid output  : Wen  Extremities: WWP      LABS:                        12.5   11.18 )-----------( 202      ( 10 Dec 2019 17:11 )             39.8     12-10    134<L>  |  98  |  19  ----------------------------<  187<H>  4.8   |  21<L>  |  0.98    Ca    7.5<L>      10 Dec 2019 17:11  Phos  3.8     12-10  Mg     2.1     12-10        CAPILLARY BLOOD GLUCOSE      POCT Blood Glucose.: 99 mg/dL (10 Dec 2019 11:36)  POCT Blood Glucose.: 88 mg/dL (10 Dec 2019 10:33)  POCT Blood Glucose.: 251 mg/dL (10 Dec 2019 06:51)  POCT Blood Glucose.: 248 mg/dL (10 Dec 2019 06:50)      Radiology and Additional Studies:    Assessment:  The patient is a 59y Female who is now several hours post-op from a Carolyn reversal extensive CARROLL, and creation of loop ileostomy    Plan:  - Pain control with PCEA- no toradol  - NPO/ IVF  - DVT ppx  - OOB and ambulating as tolerated  - F/u AM labs

## 2019-12-10 NOTE — PRE-ANESTHESIA EVALUATION ADULT - BP NONINVASIVE SYSTOLIC (MM HG)
143 Skin Substitute Text: The defect edges were debeveled with a #15 scalpel blade.  Given the location of the defect, shape of the defect and the proximity to free margins a skin substitute graft was deemed most appropriate.  The graft material was trimmed to fit the size of the defect. The graft was then placed in the primary defect and oriented appropriately.

## 2019-12-10 NOTE — BRIEF OPERATIVE NOTE - OPERATION/FINDINGS
Extensive adhesions requiring extensive adhesiolysis. Proximal transverse colon stump mobilized and brought through sub-ileocolic mesentary defect and EEA-ed to rectal stump. Previous ostomy site prepared for loop ileostomy; resected hernia sac, approximated the fascial defect (which was lateral to the semilunar line, NOT through the rectus), and brought loop ileostomy through.

## 2019-12-11 LAB
ANION GAP SERPL CALC-SCNC: 13 MMOL/L — SIGNIFICANT CHANGE UP (ref 5–17)
BUN SERPL-MCNC: 18 MG/DL — SIGNIFICANT CHANGE UP (ref 7–23)
CALCIUM SERPL-MCNC: 7.7 MG/DL — LOW (ref 8.4–10.5)
CHLORIDE SERPL-SCNC: 98 MMOL/L — SIGNIFICANT CHANGE UP (ref 96–108)
CO2 SERPL-SCNC: 26 MMOL/L — SIGNIFICANT CHANGE UP (ref 22–31)
CREAT SERPL-MCNC: 0.87 MG/DL — SIGNIFICANT CHANGE UP (ref 0.5–1.3)
GLUCOSE SERPL-MCNC: 120 MG/DL — HIGH (ref 70–99)
HCT VFR BLD CALC: 37.6 % — SIGNIFICANT CHANGE UP (ref 34.5–45)
HGB BLD-MCNC: 11.9 G/DL — SIGNIFICANT CHANGE UP (ref 11.5–15.5)
MAGNESIUM SERPL-MCNC: 2.2 MG/DL — SIGNIFICANT CHANGE UP (ref 1.6–2.6)
MCHC RBC-ENTMCNC: 27.4 PG — SIGNIFICANT CHANGE UP (ref 27–34)
MCHC RBC-ENTMCNC: 31.6 GM/DL — LOW (ref 32–36)
MCV RBC AUTO: 86.4 FL — SIGNIFICANT CHANGE UP (ref 80–100)
PHOSPHATE SERPL-MCNC: 3.7 MG/DL — SIGNIFICANT CHANGE UP (ref 2.5–4.5)
PLATELET # BLD AUTO: 221 K/UL — SIGNIFICANT CHANGE UP (ref 150–400)
POTASSIUM SERPL-MCNC: 4.6 MMOL/L — SIGNIFICANT CHANGE UP (ref 3.5–5.3)
POTASSIUM SERPL-SCNC: 4.6 MMOL/L — SIGNIFICANT CHANGE UP (ref 3.5–5.3)
RBC # BLD: 4.35 M/UL — SIGNIFICANT CHANGE UP (ref 3.8–5.2)
RBC # FLD: 13.9 % — SIGNIFICANT CHANGE UP (ref 10.3–14.5)
SODIUM SERPL-SCNC: 137 MMOL/L — SIGNIFICANT CHANGE UP (ref 135–145)
WBC # BLD: 7.32 K/UL — SIGNIFICANT CHANGE UP (ref 3.8–10.5)
WBC # FLD AUTO: 7.32 K/UL — SIGNIFICANT CHANGE UP (ref 3.8–10.5)

## 2019-12-11 RX ORDER — ACETAMINOPHEN 500 MG
1000 TABLET ORAL ONCE
Refills: 0 | Status: COMPLETED | OUTPATIENT
Start: 2019-12-11 | End: 2019-12-11

## 2019-12-11 RX ORDER — DEXTROSE MONOHYDRATE, SODIUM CHLORIDE, AND POTASSIUM CHLORIDE 50; .745; 4.5 G/1000ML; G/1000ML; G/1000ML
1000 INJECTION, SOLUTION INTRAVENOUS
Refills: 0 | Status: DISCONTINUED | OUTPATIENT
Start: 2019-12-11 | End: 2019-12-12

## 2019-12-11 RX ADMIN — Medication 1000 MILLIGRAM(S): at 02:37

## 2019-12-11 RX ADMIN — HEPARIN SODIUM 5000 UNIT(S): 5000 INJECTION INTRAVENOUS; SUBCUTANEOUS at 08:08

## 2019-12-11 RX ADMIN — HEPARIN SODIUM 5000 UNIT(S): 5000 INJECTION INTRAVENOUS; SUBCUTANEOUS at 17:59

## 2019-12-11 RX ADMIN — Medication 400 MILLIGRAM(S): at 22:42

## 2019-12-11 RX ADMIN — Medication 400 MILLIGRAM(S): at 02:07

## 2019-12-11 RX ADMIN — Medication 1000 MILLIGRAM(S): at 08:30

## 2019-12-11 RX ADMIN — HEPARIN SODIUM 5000 UNIT(S): 5000 INJECTION INTRAVENOUS; SUBCUTANEOUS at 00:21

## 2019-12-11 RX ADMIN — Medication 400 MILLIGRAM(S): at 08:06

## 2019-12-11 RX ADMIN — Medication 1000 MILLIGRAM(S): at 23:12

## 2019-12-11 NOTE — PHYSICAL THERAPY INITIAL EVALUATION ADULT - ADDITIONAL COMMENTS
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

## 2019-12-11 NOTE — PROGRESS NOTE ADULT - SUBJECTIVE AND OBJECTIVE BOX
[facility] Progress Note     Subjective/24hour Events:     Patient seen and examined on am rounds. No acute events overnight. Pain well controlled with PCEA. NPO, denies n/v. Voiding without issues, has been ambulating and OOB. Denies chills/fevers, chest pain, SOB.        Physical Exam:  Gen: NAD.  Lungs: Non labored breathing.   Ab: Soft, nontender, nondistended. NGT, ileostomy with dark liquid output  Ext: Moves all 4 spontaneously.     Vital Signs:  Vital Signs Last 24 Hrs  T(C): 36.4 (11 Dec 2019 00:44), Max: 36.5 (10 Dec 2019 06:59)  T(F): 97.5 (11 Dec 2019 00:44), Max: 97.7 (10 Dec 2019 06:59)  HR: 75 (11 Dec 2019 00:44) (66 - 87)  BP: 112/71 (11 Dec 2019 00:44) (97/62 - 143/91)  BP(mean): 92 (11 Dec 2019 00:00) (74 - 97)  RR: 18 (11 Dec 2019 00:44) (16 - 20)  SpO2: 98% (11 Dec 2019 00:44) (98% - 100%)    CAPILLARY BLOOD GLUCOSE      POCT Blood Glucose.: 99 mg/dL (10 Dec 2019 11:36)  POCT Blood Glucose.: 88 mg/dL (10 Dec 2019 10:33)  POCT Blood Glucose.: 251 mg/dL (10 Dec 2019 06:51)  POCT Blood Glucose.: 248 mg/dL (10 Dec 2019 06:50)      I&O's Detail    10 Dec 2019 07:01  -  11 Dec 2019 01:30  --------------------------------------------------------  IN:    lactated ringers.: 700 mL  Total IN: 700 mL    OUT:    Ileostomy: 100 mL    Indwelling Catheter - Urethral: 430 mL    Nasoenteral Tube: 200 mL  Total OUT: 730 mL    Total NET: -30 mL          MEDICATIONS  (STANDING):  acetaminophen  IVPB .. 1000 milliGRAM(s) IV Intermittent every 6 hours  cefoTEtan  IVPB 2 Gram(s) IV Intermittent once  heparin  Injectable 5000 Unit(s) SubCutaneous every 8 hours  hydromorphone (10 MICROgram(s)/mL) + bupivacaine 0.0625% in 0.9% Sodium Chloride PCEA 250 milliLiter(s) Epidural PCA Continuous  influenza   Vaccine 0.5 milliLiter(s) IntraMuscular once  lactated ringers. 1000 milliLiter(s) (100 mL/Hr) IV Continuous <Continuous>    MEDICATIONS  (PRN):  hydromorphone (10 MICROgram(s)/mL) + bupivacaine 0.0625% in 0.9% Sodium Chloride PCEA Rescue Clinician  Bolus 5 milliLiter(s) Epidural every 15 minutes PRN for Pain Score greater than 6  naloxone Injectable 0.1 milliGRAM(s) IV Push every 3 minutes PRN For ANY of the following changes in patient status:  A. RR LESS THAN 10 breaths per minute, B. Oxygen saturation LESS THAN 90%, C. Sedation score of 6  ondansetron Injectable 4 milliGRAM(s) IV Push every 6 hours PRN Nausea          Labs:    12-10    134<L>  |  98  |  19  ----------------------------<  187<H>  4.8   |  21<L>  |  0.98    Ca    7.5<L>      10 Dec 2019 17:11  Phos  3.8     12-10  Mg     2.1     12-10                              12.5   11.18 )-----------( 202      ( 10 Dec 2019 17:11 )             39.8         Imaging: [facility] Progress Note     Subjective/24hour Events:     Patient seen and examined on am rounds. No acute events overnight. Pain well controlled with PCEA. NPO, denies n/v. Wen with expected red tinged urine, has been ambulating and OOB. Denies chills/fevers, chest pain, SOB.        Physical Exam:  Gen: NAD.  Lungs: Non labored breathing.   Ab: Soft, nontender, nondistended. NGT, ileostomy with dark liquid output  Ext: Moves all 4 spontaneously.     Vital Signs:  Vital Signs Last 24 Hrs  T(C): 36.4 (11 Dec 2019 00:44), Max: 36.5 (10 Dec 2019 06:59)  T(F): 97.5 (11 Dec 2019 00:44), Max: 97.7 (10 Dec 2019 06:59)  HR: 75 (11 Dec 2019 00:44) (66 - 87)  BP: 112/71 (11 Dec 2019 00:44) (97/62 - 143/91)  BP(mean): 92 (11 Dec 2019 00:00) (74 - 97)  RR: 18 (11 Dec 2019 00:44) (16 - 20)  SpO2: 98% (11 Dec 2019 00:44) (98% - 100%)    CAPILLARY BLOOD GLUCOSE      POCT Blood Glucose.: 99 mg/dL (10 Dec 2019 11:36)  POCT Blood Glucose.: 88 mg/dL (10 Dec 2019 10:33)  POCT Blood Glucose.: 251 mg/dL (10 Dec 2019 06:51)  POCT Blood Glucose.: 248 mg/dL (10 Dec 2019 06:50)      I&O's Detail    10 Dec 2019 07:01  -  11 Dec 2019 01:30  --------------------------------------------------------  IN:    lactated ringers.: 700 mL  Total IN: 700 mL    OUT:    Ileostomy: 100 mL    Indwelling Catheter - Urethral: 430 mL    Nasoenteral Tube: 200 mL  Total OUT: 730 mL    Total NET: -30 mL          MEDICATIONS  (STANDING):  acetaminophen  IVPB .. 1000 milliGRAM(s) IV Intermittent every 6 hours  cefoTEtan  IVPB 2 Gram(s) IV Intermittent once  heparin  Injectable 5000 Unit(s) SubCutaneous every 8 hours  hydromorphone (10 MICROgram(s)/mL) + bupivacaine 0.0625% in 0.9% Sodium Chloride PCEA 250 milliLiter(s) Epidural PCA Continuous  influenza   Vaccine 0.5 milliLiter(s) IntraMuscular once  lactated ringers. 1000 milliLiter(s) (100 mL/Hr) IV Continuous <Continuous>    MEDICATIONS  (PRN):  hydromorphone (10 MICROgram(s)/mL) + bupivacaine 0.0625% in 0.9% Sodium Chloride PCEA Rescue Clinician  Bolus 5 milliLiter(s) Epidural every 15 minutes PRN for Pain Score greater than 6  naloxone Injectable 0.1 milliGRAM(s) IV Push every 3 minutes PRN For ANY of the following changes in patient status:  A. RR LESS THAN 10 breaths per minute, B. Oxygen saturation LESS THAN 90%, C. Sedation score of 6  ondansetron Injectable 4 milliGRAM(s) IV Push every 6 hours PRN Nausea          Labs:    12-10    134<L>  |  98  |  19  ----------------------------<  187<H>  4.8   |  21<L>  |  0.98    Ca    7.5<L>      10 Dec 2019 17:11  Phos  3.8     12-10  Mg     2.1     12-10                              12.5   11.18 )-----------( 202      ( 10 Dec 2019 17:11 )             39.8         Imaging: [facility] Progress Note     Subjective/24hour Events:     Patient seen and examined on am rounds. No acute events overnight. Pain well controlled with PCEA. NPO, denies n/v. Wen with expected red tinged urine. Denies chills/fevers, chest pain, SOB.  osto        Physical Exam:  Gen: NAD.  Lungs: Non labored breathing.   Ab: Soft, nontender, nondistended. NGT, ileostomy with dark liquid output  Ext: Moves all 4 spontaneously.     Vital Signs:  Vital Signs Last 24 Hrs  T(C): 36.4 (11 Dec 2019 00:44), Max: 36.5 (10 Dec 2019 06:59)  T(F): 97.5 (11 Dec 2019 00:44), Max: 97.7 (10 Dec 2019 06:59)  HR: 75 (11 Dec 2019 00:44) (66 - 87)  BP: 112/71 (11 Dec 2019 00:44) (97/62 - 143/91)  BP(mean): 92 (11 Dec 2019 00:00) (74 - 97)  RR: 18 (11 Dec 2019 00:44) (16 - 20)  SpO2: 98% (11 Dec 2019 00:44) (98% - 100%)    CAPILLARY BLOOD GLUCOSE      POCT Blood Glucose.: 99 mg/dL (10 Dec 2019 11:36)  POCT Blood Glucose.: 88 mg/dL (10 Dec 2019 10:33)  POCT Blood Glucose.: 251 mg/dL (10 Dec 2019 06:51)  POCT Blood Glucose.: 248 mg/dL (10 Dec 2019 06:50)      I&O's Detail    10 Dec 2019 07:01  -  11 Dec 2019 01:30  --------------------------------------------------------  IN:    lactated ringers.: 700 mL  Total IN: 700 mL    OUT:    Ileostomy: 100 mL    Indwelling Catheter - Urethral: 430 mL    Nasoenteral Tube: 200 mL  Total OUT: 730 mL    Total NET: -30 mL          MEDICATIONS  (STANDING):  acetaminophen  IVPB .. 1000 milliGRAM(s) IV Intermittent every 6 hours  cefoTEtan  IVPB 2 Gram(s) IV Intermittent once  heparin  Injectable 5000 Unit(s) SubCutaneous every 8 hours  hydromorphone (10 MICROgram(s)/mL) + bupivacaine 0.0625% in 0.9% Sodium Chloride PCEA 250 milliLiter(s) Epidural PCA Continuous  influenza   Vaccine 0.5 milliLiter(s) IntraMuscular once  lactated ringers. 1000 milliLiter(s) (100 mL/Hr) IV Continuous <Continuous>    MEDICATIONS  (PRN):  hydromorphone (10 MICROgram(s)/mL) + bupivacaine 0.0625% in 0.9% Sodium Chloride PCEA Rescue Clinician  Bolus 5 milliLiter(s) Epidural every 15 minutes PRN for Pain Score greater than 6  naloxone Injectable 0.1 milliGRAM(s) IV Push every 3 minutes PRN For ANY of the following changes in patient status:  A. RR LESS THAN 10 breaths per minute, B. Oxygen saturation LESS THAN 90%, C. Sedation score of 6  ondansetron Injectable 4 milliGRAM(s) IV Push every 6 hours PRN Nausea          Labs:    12-10    134<L>  |  98  |  19  ----------------------------<  187<H>  4.8   |  21<L>  |  0.98    Ca    7.5<L>      10 Dec 2019 17:11  Phos  3.8     12-10  Mg     2.1     12-10                              12.5   11.18 )-----------( 202      ( 10 Dec 2019 17:11 )             39.8         Imaging: [facility] Progress Note     Subjective/24hour Events:     Patient seen and examined on am rounds. No acute events overnight. Pain well controlled with PCEA. NPO NGT, denies n/v. Wen with expected red tinged urine. Denies chills/fevers, chest pain, SOB.  ostomy +/-        Physical Exam:  Gen: NAD.  Lungs: Non labored breathing.   Ab: Soft, nontender, nondistended. NGT, ileostomy with dark liquid output  Ext: Moves all 4 spontaneously.     Vital Signs:  Vital Signs Last 24 Hrs  T(C): 36.4 (11 Dec 2019 00:44), Max: 36.5 (10 Dec 2019 06:59)  T(F): 97.5 (11 Dec 2019 00:44), Max: 97.7 (10 Dec 2019 06:59)  HR: 75 (11 Dec 2019 00:44) (66 - 87)  BP: 112/71 (11 Dec 2019 00:44) (97/62 - 143/91)  BP(mean): 92 (11 Dec 2019 00:00) (74 - 97)  RR: 18 (11 Dec 2019 00:44) (16 - 20)  SpO2: 98% (11 Dec 2019 00:44) (98% - 100%)    CAPILLARY BLOOD GLUCOSE      POCT Blood Glucose.: 99 mg/dL (10 Dec 2019 11:36)  POCT Blood Glucose.: 88 mg/dL (10 Dec 2019 10:33)  POCT Blood Glucose.: 251 mg/dL (10 Dec 2019 06:51)  POCT Blood Glucose.: 248 mg/dL (10 Dec 2019 06:50)      I&O's Detail    10 Dec 2019 07:01  -  11 Dec 2019 01:30  --------------------------------------------------------  IN:    lactated ringers.: 700 mL  Total IN: 700 mL    OUT:    Ileostomy: 100 mL    Indwelling Catheter - Urethral: 430 mL    Nasoenteral Tube: 200 mL  Total OUT: 730 mL    Total NET: -30 mL          MEDICATIONS  (STANDING):  acetaminophen  IVPB .. 1000 milliGRAM(s) IV Intermittent every 6 hours  cefoTEtan  IVPB 2 Gram(s) IV Intermittent once  heparin  Injectable 5000 Unit(s) SubCutaneous every 8 hours  hydromorphone (10 MICROgram(s)/mL) + bupivacaine 0.0625% in 0.9% Sodium Chloride PCEA 250 milliLiter(s) Epidural PCA Continuous  influenza   Vaccine 0.5 milliLiter(s) IntraMuscular once  lactated ringers. 1000 milliLiter(s) (100 mL/Hr) IV Continuous <Continuous>    MEDICATIONS  (PRN):  hydromorphone (10 MICROgram(s)/mL) + bupivacaine 0.0625% in 0.9% Sodium Chloride PCEA Rescue Clinician  Bolus 5 milliLiter(s) Epidural every 15 minutes PRN for Pain Score greater than 6  naloxone Injectable 0.1 milliGRAM(s) IV Push every 3 minutes PRN For ANY of the following changes in patient status:  A. RR LESS THAN 10 breaths per minute, B. Oxygen saturation LESS THAN 90%, C. Sedation score of 6  ondansetron Injectable 4 milliGRAM(s) IV Push every 6 hours PRN Nausea          Labs:    12-10    134<L>  |  98  |  19  ----------------------------<  187<H>  4.8   |  21<L>  |  0.98    Ca    7.5<L>      10 Dec 2019 17:11  Phos  3.8     12-10  Mg     2.1     12-10                              12.5   11.18 )-----------( 202      ( 10 Dec 2019 17:11 )             39.8         Imaging: [facility] Progress Note     Subjective/24hour Events:     Patient seen and examined on am rounds. No acute events overnight. Pain well controlled with PCEA. NPO NGT, denies n/v. Wen with expected red tinged urine. Denies chills/fevers, chest pain, SOB.  ostomy +/+        Physical Exam:  Gen: NAD.  Lungs: Non labored breathing.   Ab: Soft, nontender, nondistended. NGT, ileostomy with dark liquid output  Ext: Moves all 4 spontaneously.     Vital Signs:  Vital Signs Last 24 Hrs  T(C): 36.4 (11 Dec 2019 00:44), Max: 36.5 (10 Dec 2019 06:59)  T(F): 97.5 (11 Dec 2019 00:44), Max: 97.7 (10 Dec 2019 06:59)  HR: 75 (11 Dec 2019 00:44) (66 - 87)  BP: 112/71 (11 Dec 2019 00:44) (97/62 - 143/91)  BP(mean): 92 (11 Dec 2019 00:00) (74 - 97)  RR: 18 (11 Dec 2019 00:44) (16 - 20)  SpO2: 98% (11 Dec 2019 00:44) (98% - 100%)    CAPILLARY BLOOD GLUCOSE      POCT Blood Glucose.: 99 mg/dL (10 Dec 2019 11:36)  POCT Blood Glucose.: 88 mg/dL (10 Dec 2019 10:33)  POCT Blood Glucose.: 251 mg/dL (10 Dec 2019 06:51)  POCT Blood Glucose.: 248 mg/dL (10 Dec 2019 06:50)      I&O's Detail    10 Dec 2019 07:01  -  11 Dec 2019 01:30  --------------------------------------------------------  IN:    lactated ringers.: 700 mL  Total IN: 700 mL    OUT:    Ileostomy: 100 mL    Indwelling Catheter - Urethral: 430 mL    Nasoenteral Tube: 200 mL  Total OUT: 730 mL    Total NET: -30 mL          MEDICATIONS  (STANDING):  acetaminophen  IVPB .. 1000 milliGRAM(s) IV Intermittent every 6 hours  cefoTEtan  IVPB 2 Gram(s) IV Intermittent once  heparin  Injectable 5000 Unit(s) SubCutaneous every 8 hours  hydromorphone (10 MICROgram(s)/mL) + bupivacaine 0.0625% in 0.9% Sodium Chloride PCEA 250 milliLiter(s) Epidural PCA Continuous  influenza   Vaccine 0.5 milliLiter(s) IntraMuscular once  lactated ringers. 1000 milliLiter(s) (100 mL/Hr) IV Continuous <Continuous>    MEDICATIONS  (PRN):  hydromorphone (10 MICROgram(s)/mL) + bupivacaine 0.0625% in 0.9% Sodium Chloride PCEA Rescue Clinician  Bolus 5 milliLiter(s) Epidural every 15 minutes PRN for Pain Score greater than 6  naloxone Injectable 0.1 milliGRAM(s) IV Push every 3 minutes PRN For ANY of the following changes in patient status:  A. RR LESS THAN 10 breaths per minute, B. Oxygen saturation LESS THAN 90%, C. Sedation score of 6  ondansetron Injectable 4 milliGRAM(s) IV Push every 6 hours PRN Nausea          Labs:    12-10    134<L>  |  98  |  19  ----------------------------<  187<H>  4.8   |  21<L>  |  0.98    Ca    7.5<L>      10 Dec 2019 17:11  Phos  3.8     12-10  Mg     2.1     12-10                              12.5   11.18 )-----------( 202      ( 10 Dec 2019 17:11 )             39.8         Imaging:

## 2019-12-11 NOTE — PROGRESS NOTE ADULT - SUBJECTIVE AND OBJECTIVE BOX
Day 1 of Anesthesia Pain Management Service    SUBJECTIVE: Patient doing well with PCEA    Pain Scale Score:   Refer to charted pain scores    THERAPY:  [X] Epidural Bupivacaine 0.0625% and Hydromorphone         [X] 10 micrograms/mL 	[ ] 5 micrograms/mL  [ ] Epidural Ropivacaine 0.2% plain – 1 mg/mL    Demand dose: 3 mL  Lockout: 15 minutes  Continuous Rate: 4 mL/hr    MEDICATIONS  (STANDING):  acetaminophen  IVPB .. 1000 milliGRAM(s) IV Intermittent every 6 hours  heparin  Injectable 5000 Unit(s) SubCutaneous every 8 hours  hydromorphone (10 MICROgram(s)/mL) + bupivacaine 0.0625% in 0.9% Sodium Chloride PCEA 250 milliLiter(s) Epidural PCA Continuous  influenza   Vaccine 0.5 milliLiter(s) IntraMuscular once  lactated ringers. 1000 milliLiter(s) (100 mL/Hr) IV Continuous <Continuous>    MEDICATIONS  (PRN):  hydromorphone (10 MICROgram(s)/mL) + bupivacaine 0.0625% in 0.9% Sodium Chloride PCEA Rescue Clinician  Bolus 5 milliLiter(s) Epidural every 15 minutes PRN for Pain Score greater than 6  naloxone Injectable 0.1 milliGRAM(s) IV Push every 3 minutes PRN For ANY of the following changes in patient status:  A. RR LESS THAN 10 breaths per minute, B. Oxygen saturation LESS THAN 90%, C. Sedation score of 6  ondansetron Injectable 4 milliGRAM(s) IV Push every 6 hours PRN Nausea      OBJECTIVE:    Assessment of Catheter Site:    [X] Epidural 	  [X] Dressing intact	[X] Site non-tender	[X] Site without erythema, discharge, edema  [X] Epidural tubing and connection checked	[X] Gross neurological exam within normal limits  [ ] Catheter removed – tip intact		[X] Afebrile	            [ ] Febrile: ___                          11.9   7.32  )-----------( 221      ( 11 Dec 2019 08:22 )             37.6     Vital Signs Last 24 Hrs  T(C): 36.7 (12-11-19 @ 03:50), Max: 36.8 (12-11-19 @ 01:42)  T(F): 98 (12-11-19 @ 03:50), Max: 98.3 (12-11-19 @ 02:52)  HR: 80 (12-11-19 @ 03:50) (66 - 87)  BP: 107/70 (12-11-19 @ 03:50) (94/62 - 127/76)  BP(mean): 92 (12-11-19 @ 00:00) (74 - 97)  RR: 18 (12-11-19 @ 03:50) (16 - 20)  SpO2: 97% (12-11-19 @ 03:50) (95% - 100%)      Sedation Score:	[X] Alert	[ ] Drowsy	[ ] Arousable  [ ] Asleep     [ ] Unresponsive    Side Effects:	[X] None	[ ] Nausea	[ ] Vomiting   [ ] Pruritus  		[ ] Weakness     [ ] Numbness	[ ] Other:    ASSESSMENT/ PLAN:    Therapy:	[X] Continue   [ ] Discontinue   [ ] Change to PRN Analgesics   [ ] Change to PCA    Documentation and Verification of current medications:  [X] Done	[ ] Not done, not eligible, reason:    COMMENTS: Pain controlled. Continue.

## 2019-12-11 NOTE — DIETITIAN INITIAL EVALUATION ADULT. - NUTRITIONGOAL OUTCOME1
Progress diet to clear liquid followed by low fiber diet when medically feasible. Advance diet to clear liquid followed by low fiber diet when medically feasible. Pt to meet greater than 75% of estimated needs.

## 2019-12-11 NOTE — DIETITIAN INITIAL EVALUATION ADULT. - ETIOLOGY
altered GI function (s/p Carolyn reversal extensive FRIDA, appendectomy, and creation of loop ileostomy)

## 2019-12-11 NOTE — PROGRESS NOTE ADULT - ASSESSMENT
59y Female s/p Carolyn reversal extensive FRIDA, and creation of loop ileostomy 12/10.      Plan:  - Pain control with PCEA- no toradol  - NPO/ IVF  - DVT ppx  - OOB and ambulating as tolerated  - F/u AM labs.  [id number] 59y Female s/p Carolyn reversal extensive FRIDA, and creation of loop ileostomy 12/10.      Plan:  - Pain control with PCEA- no toradol  - NPO/ IVF  - DVT ppx  - OOB and ambulating as tolerated  - F/u AM labs.  -NGT  - maier    [id number] 59y Female s/p Carolyn reversal extensive FRIDA, appendectomy, and creation of loop ileostomy 12/10.      Plan:  - Pain control with PCEA- no toradol  - NPO/ IVF  - DVT ppx  - OOB and ambulating as tolerated  - F/u AM labs.  -NGT  - maier  - PT, ostomy team    [id number]

## 2019-12-11 NOTE — DIETITIAN INITIAL EVALUATION ADULT. - ADD RECOMMEND
1. Change diet order to clear liquid followed by low fiber diet when medically feasible 2. Encourage PO protein intake 3. Diet education provided, reinforce as needed. 1. Medical team to advance diet as tolerated. Consider advancing to clear liquid diet, following by low fiber. Monitor need for CHO consistent diet modifier.  2. Encourage PO protein intake 3. Diet education provided, reinforce as needed.

## 2019-12-11 NOTE — PHYSICAL THERAPY INITIAL EVALUATION ADULT - PERTINENT HX OF CURRENT PROBLEM, REHAB EVAL
Pt is a 59y Female admitted to [facility] on 12/10/19 s/p Carolyn reversal extensive FRIDA, appendectomy, and creation of loop ileostomy 12/10. +PCEA pump

## 2019-12-11 NOTE — DIETITIAN INITIAL EVALUATION ADULT. - PHYSICAL APPEARANCE
well nourished/other (specify) Ht:  60in   Wt:  131lbs   IBW:  100lbs  +/-10%    BMI:  25.6 mg/d2    %IBW: 131%  Skin:   Edema: Ht:  60in   Wt:  131lbs   IBW:  100lbs  +/-10%    BMI:  25.6 mg/d2    %IBW: 131%  Skin: Surgical incision. No pressure injuries noted.   Edema: No edema noted.

## 2019-12-12 ENCOUNTER — TRANSCRIPTION ENCOUNTER (OUTPATIENT)
Age: 59
End: 2019-12-12

## 2019-12-12 LAB
ANION GAP SERPL CALC-SCNC: 10 MMOL/L — SIGNIFICANT CHANGE UP (ref 5–17)
ANION GAP SERPL CALC-SCNC: 11 MMOL/L — SIGNIFICANT CHANGE UP (ref 5–17)
APTT BLD: 25.2 SEC — LOW (ref 27.5–36.3)
BUN SERPL-MCNC: 10 MG/DL — SIGNIFICANT CHANGE UP (ref 7–23)
BUN SERPL-MCNC: 8 MG/DL — SIGNIFICANT CHANGE UP (ref 7–23)
CALCIUM SERPL-MCNC: 7.9 MG/DL — LOW (ref 8.4–10.5)
CALCIUM SERPL-MCNC: 8 MG/DL — LOW (ref 8.4–10.5)
CHLORIDE SERPL-SCNC: 100 MMOL/L — SIGNIFICANT CHANGE UP (ref 96–108)
CHLORIDE SERPL-SCNC: 96 MMOL/L — SIGNIFICANT CHANGE UP (ref 96–108)
CO2 SERPL-SCNC: 23 MMOL/L — SIGNIFICANT CHANGE UP (ref 22–31)
CO2 SERPL-SCNC: 25 MMOL/L — SIGNIFICANT CHANGE UP (ref 22–31)
CREAT SERPL-MCNC: 0.65 MG/DL — SIGNIFICANT CHANGE UP (ref 0.5–1.3)
CREAT SERPL-MCNC: 0.74 MG/DL — SIGNIFICANT CHANGE UP (ref 0.5–1.3)
GLUCOSE SERPL-MCNC: 106 MG/DL — HIGH (ref 70–99)
GLUCOSE SERPL-MCNC: 118 MG/DL — HIGH (ref 70–99)
HCT VFR BLD CALC: 30.2 % — LOW (ref 34.5–45)
HCT VFR BLD CALC: 34.1 % — LOW (ref 34.5–45)
HGB BLD-MCNC: 10.5 G/DL — LOW (ref 11.5–15.5)
HGB BLD-MCNC: 9.6 G/DL — LOW (ref 11.5–15.5)
INR BLD: 1.11 RATIO — SIGNIFICANT CHANGE UP (ref 0.88–1.16)
MAGNESIUM SERPL-MCNC: 2 MG/DL — SIGNIFICANT CHANGE UP (ref 1.6–2.6)
MCHC RBC-ENTMCNC: 27.4 PG — SIGNIFICANT CHANGE UP (ref 27–34)
MCHC RBC-ENTMCNC: 27.6 PG — SIGNIFICANT CHANGE UP (ref 27–34)
MCHC RBC-ENTMCNC: 30.8 GM/DL — LOW (ref 32–36)
MCHC RBC-ENTMCNC: 31.8 GM/DL — LOW (ref 32–36)
MCV RBC AUTO: 86.3 FL — SIGNIFICANT CHANGE UP (ref 80–100)
MCV RBC AUTO: 89.5 FL — SIGNIFICANT CHANGE UP (ref 80–100)
NRBC # BLD: 0 /100 WBCS — SIGNIFICANT CHANGE UP (ref 0–0)
PHOSPHATE SERPL-MCNC: 1.8 MG/DL — LOW (ref 2.5–4.5)
PLATELET # BLD AUTO: 169 K/UL — SIGNIFICANT CHANGE UP (ref 150–400)
PLATELET # BLD AUTO: 186 K/UL — SIGNIFICANT CHANGE UP (ref 150–400)
POTASSIUM SERPL-MCNC: 3.6 MMOL/L — SIGNIFICANT CHANGE UP (ref 3.5–5.3)
POTASSIUM SERPL-MCNC: 4.5 MMOL/L — SIGNIFICANT CHANGE UP (ref 3.5–5.3)
POTASSIUM SERPL-SCNC: 3.6 MMOL/L — SIGNIFICANT CHANGE UP (ref 3.5–5.3)
POTASSIUM SERPL-SCNC: 4.5 MMOL/L — SIGNIFICANT CHANGE UP (ref 3.5–5.3)
PROTHROM AB SERPL-ACNC: 12.6 SEC — SIGNIFICANT CHANGE UP (ref 10–13.1)
RBC # BLD: 3.5 M/UL — LOW (ref 3.8–5.2)
RBC # BLD: 3.81 M/UL — SIGNIFICANT CHANGE UP (ref 3.8–5.2)
RBC # FLD: 13.9 % — SIGNIFICANT CHANGE UP (ref 10.3–14.5)
RBC # FLD: 14.2 % — SIGNIFICANT CHANGE UP (ref 10.3–14.5)
SODIUM SERPL-SCNC: 130 MMOL/L — LOW (ref 135–145)
SODIUM SERPL-SCNC: 135 MMOL/L — SIGNIFICANT CHANGE UP (ref 135–145)
WBC # BLD: 7.78 K/UL — SIGNIFICANT CHANGE UP (ref 3.8–10.5)
WBC # BLD: 9.62 K/UL — SIGNIFICANT CHANGE UP (ref 3.8–10.5)
WBC # FLD AUTO: 7.78 K/UL — SIGNIFICANT CHANGE UP (ref 3.8–10.5)
WBC # FLD AUTO: 9.62 K/UL — SIGNIFICANT CHANGE UP (ref 3.8–10.5)

## 2019-12-12 PROCEDURE — 71045 X-RAY EXAM CHEST 1 VIEW: CPT | Mod: 26

## 2019-12-12 RX ORDER — ACETAMINOPHEN 500 MG
1000 TABLET ORAL EVERY 6 HOURS
Refills: 0 | Status: COMPLETED | OUTPATIENT
Start: 2019-12-12 | End: 2019-12-12

## 2019-12-12 RX ORDER — ACETAMINOPHEN 500 MG
1000 TABLET ORAL ONCE
Refills: 0 | Status: COMPLETED | OUTPATIENT
Start: 2019-12-12 | End: 2019-12-12

## 2019-12-12 RX ORDER — ACETAMINOPHEN 500 MG
1000 TABLET ORAL ONCE
Refills: 0 | Status: COMPLETED | OUTPATIENT
Start: 2019-12-13 | End: 2019-12-13

## 2019-12-12 RX ADMIN — Medication 85 MILLIMOLE(S): at 10:03

## 2019-12-12 RX ADMIN — HEPARIN SODIUM 5000 UNIT(S): 5000 INJECTION INTRAVENOUS; SUBCUTANEOUS at 17:37

## 2019-12-12 RX ADMIN — Medication 1000 MILLIGRAM(S): at 10:14

## 2019-12-12 RX ADMIN — HEPARIN SODIUM 5000 UNIT(S): 5000 INJECTION INTRAVENOUS; SUBCUTANEOUS at 09:48

## 2019-12-12 RX ADMIN — Medication 400 MILLIGRAM(S): at 09:44

## 2019-12-12 RX ADMIN — Medication 400 MILLIGRAM(S): at 21:44

## 2019-12-12 RX ADMIN — Medication 1000 MILLIGRAM(S): at 22:14

## 2019-12-12 RX ADMIN — HEPARIN SODIUM 5000 UNIT(S): 5000 INJECTION INTRAVENOUS; SUBCUTANEOUS at 01:30

## 2019-12-12 NOTE — PROGRESS NOTE ADULT - SUBJECTIVE AND OBJECTIVE BOX
GENERAL SURGERY PROGRESS NOTE    SUBJECTIVE  Patient seen and examined. No acute events overnight. NGT and maier removed 12/11, patient passed TOV. Reports tolerating CLD without nausea, vomiting, passing flatus, no bowel movements, voiding without issues, have been ambulating and out of bed. Denies fever, chills, SOB, chest pain.         OBJECTIVE    PHYSICAL EXAM  General: Appears well, NAD  CHEST: breathing comfortably  CV: appears well perfused  Abdomen: soft, nontender, nondistended, no rebound or guarding, incisions c/d/i  Extremities: Grossly symmetric    T(C): 37.4 (12-12-19 @ 04:22), Max: 37.7 (12-11-19 @ 22:04)  HR: 91 (12-12-19 @ 04:22) (84 - 97)  BP: 109/70 (12-12-19 @ 04:22) (97/59 - 146/80)  RR: 18 (12-12-19 @ 04:22) (18 - 18)  SpO2: 97% (12-12-19 @ 04:22) (96% - 98%)    12-10-19 @ 07:01  -  12-11-19 @ 07:00  --------------------------------------------------------  IN: 1101 mL / OUT: 930 mL / NET: 171 mL    12-11-19 @ 07:01  -  12-12-19 @ 04:27  --------------------------------------------------------  IN: 160 mL / OUT: 750 mL / NET: -590 mL        MEDICATIONS  acetaminophen  IVPB .. 1000 milliGRAM(s) IV Intermittent every 6 hours  dextrose 5% + sodium chloride 0.45% with potassium chloride 20 mEq/L 1000 milliLiter(s) IV Continuous <Continuous>  heparin  Injectable 5000 Unit(s) SubCutaneous every 8 hours  hydromorphone (10 MICROgram(s)/mL) + bupivacaine 0.0625% in 0.9% Sodium Chloride PCEA 250 milliLiter(s) Epidural PCA Continuous  hydromorphone (10 MICROgram(s)/mL) + bupivacaine 0.0625% in 0.9% Sodium Chloride PCEA Rescue Clinician  Bolus 5 milliLiter(s) Epidural every 15 minutes PRN  influenza   Vaccine 0.5 milliLiter(s) IntraMuscular once  naloxone Injectable 0.1 milliGRAM(s) IV Push every 3 minutes PRN  ondansetron Injectable 4 milliGRAM(s) IV Push every 6 hours PRN      LABS                        11.9   7.32  )-----------( 221      ( 11 Dec 2019 08:22 )             37.6     12-11    137  |  98  |  18  ----------------------------<  120<H>  4.6   |  26  |  0.87    Ca    7.7<L>      11 Dec 2019 06:03  Phos  3.7     12-11  Mg     2.2     12-11            RADIOLOGY & ADDITIONAL STUDIES GENERAL SURGERY PROGRESS NOTE    SUBJECTIVE  Patient seen and examined. No acute events overnight. NGT and maier removed 12/11, patient passed TOV. Reports tolerating CLD without nausea, vomiting, passing flatus, no bowel movements, voiding without issues, have been ambulating and out of bed. Denies fever, chills, SOB, chest pain.         OBJECTIVE    PHYSICAL EXAM  General: Appears well, NAD  CHEST: breathing comfortably  CV: appears well perfused  Abdomen: soft, nontender, nondistended, no rebound or guarding, incisions c/d/i, ostomy pink viable  Extremities: Grossly symmetric    T(C): 37.4 (12-12-19 @ 04:22), Max: 37.7 (12-11-19 @ 22:04)  HR: 91 (12-12-19 @ 04:22) (84 - 97)  BP: 109/70 (12-12-19 @ 04:22) (97/59 - 146/80)  RR: 18 (12-12-19 @ 04:22) (18 - 18)  SpO2: 97% (12-12-19 @ 04:22) (96% - 98%)    12-10-19 @ 07:01  -  12-11-19 @ 07:00  --------------------------------------------------------  IN: 1101 mL / OUT: 930 mL / NET: 171 mL    12-11-19 @ 07:01  -  12-12-19 @ 04:27  --------------------------------------------------------  IN: 160 mL / OUT: 750 mL / NET: -590 mL        MEDICATIONS  acetaminophen  IVPB .. 1000 milliGRAM(s) IV Intermittent every 6 hours  dextrose 5% + sodium chloride 0.45% with potassium chloride 20 mEq/L 1000 milliLiter(s) IV Continuous <Continuous>  heparin  Injectable 5000 Unit(s) SubCutaneous every 8 hours  hydromorphone (10 MICROgram(s)/mL) + bupivacaine 0.0625% in 0.9% Sodium Chloride PCEA 250 milliLiter(s) Epidural PCA Continuous  hydromorphone (10 MICROgram(s)/mL) + bupivacaine 0.0625% in 0.9% Sodium Chloride PCEA Rescue Clinician  Bolus 5 milliLiter(s) Epidural every 15 minutes PRN  influenza   Vaccine 0.5 milliLiter(s) IntraMuscular once  naloxone Injectable 0.1 milliGRAM(s) IV Push every 3 minutes PRN  ondansetron Injectable 4 milliGRAM(s) IV Push every 6 hours PRN      LABS                        11.9   7.32  )-----------( 221      ( 11 Dec 2019 08:22 )             37.6     12-11    137  |  98  |  18  ----------------------------<  120<H>  4.6   |  26  |  0.87    Ca    7.7<L>      11 Dec 2019 06:03  Phos  3.7     12-11  Mg     2.2     12-11            RADIOLOGY & ADDITIONAL STUDIES

## 2019-12-12 NOTE — DISCHARGE NOTE PROVIDER - CARE PROVIDER_API CALL
Lanre Navarro)  ColonRectal Surgery; Surgery  [address], [address]  [place] [address]  Phone: [phone]  Fax: [phone]  Follow Up Time: 2 weeks

## 2019-12-12 NOTE — PROGRESS NOTE ADULT - SUBJECTIVE AND OBJECTIVE BOX
Day 2 of Anesthesia Pain Management Service    SUBJECTIVE: Patient doing well with PCEA    Pain Scale Score:   Refer to charted pain scores    THERAPY:  [X] Epidural Bupivacaine 0.0625% and Hydromorphone         [X] 10 micrograms/mL 	[ ] 5 micrograms/mL  [ ] Epidural Ropivacaine 0.2% plain – 1 mg/mL    Demand dose: 3 mL  Lockout: 15 minutes  Continuous Rate: 4 mL/hr    MEDICATIONS  (STANDING):  heparin  Injectable 5000 Unit(s) SubCutaneous every 8 hours  hydromorphone (10 MICROgram(s)/mL) + bupivacaine 0.0625% in 0.9% Sodium Chloride PCEA 250 milliLiter(s) Epidural PCA Continuous  influenza   Vaccine 0.5 milliLiter(s) IntraMuscular once  sodium phosphate IVPB 30 milliMole(s) IV Intermittent once    MEDICATIONS  (PRN):  hydromorphone (10 MICROgram(s)/mL) + bupivacaine 0.0625% in 0.9% Sodium Chloride PCEA Rescue Clinician  Bolus 5 milliLiter(s) Epidural every 15 minutes PRN for Pain Score greater than 6  naloxone Injectable 0.1 milliGRAM(s) IV Push every 3 minutes PRN For ANY of the following changes in patient status:  A. RR LESS THAN 10 breaths per minute, B. Oxygen saturation LESS THAN 90%, C. Sedation score of 6  ondansetron Injectable 4 milliGRAM(s) IV Push every 6 hours PRN Nausea      OBJECTIVE:    Assessment of Catheter Site:    [X] Epidural 	  [X] Dressing intact	[X] Site non-tender	[X] Site without erythema, discharge, edema  [X] Epidural tubing and connection checked	[X] Gross neurological exam within normal limits  [ ] Catheter removed – tip intact		[X] Afebrile	            [ ] Febrile: ___    PT/INR - ( 12 Dec 2019 08:26 )   PT: 12.6 sec;   INR: 1.11 ratio         PTT - ( 12 Dec 2019 08:26 )  PTT:25.2 sec                      10.5   7.78  )-----------( 169      ( 12 Dec 2019 08:31 )             34.1     Vital Signs Last 24 Hrs  T(C): 37.4 (12-12-19 @ 04:22), Max: 37.7 (12-11-19 @ 22:04)  T(F): 99.3 (12-12-19 @ 04:22), Max: 99.8 (12-11-19 @ 22:04)  HR: 91 (12-12-19 @ 04:22) (84 - 97)  BP: 109/70 (12-12-19 @ 04:22) (97/59 - 146/80)  BP(mean): --  RR: 18 (12-12-19 @ 04:22) (18 - 18)  SpO2: 97% (12-12-19 @ 04:22) (96% - 98%)      Sedation Score:	[X] Alert	[ ] Drowsy	[ ] Arousable  [ ] Asleep     [ ] Unresponsive    Side Effects:	[X] None	[ ] Nausea	[ ] Vomiting   [ ] Pruritus  		[ ] Weakness     [ ] Numbness	[ ] Other:    ASSESSMENT/ PLAN:    Therapy:	[X] Continue   [ ] Discontinue   [ ] Change to PRN Analgesics   [ ] Change to PCA    Documentation and Verification of current medications:  [X] Done	[ ] Not done, not eligible, reason:    COMMENTS: OOB in chair. Pain controlled. Endorses deep RLQ abdominal tenderness. Encouraged PRN doses prior to activity.

## 2019-12-12 NOTE — DISCHARGE NOTE PROVIDER - HOSPITAL COURSE
Ms. 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 She states that she was on an [place] at the end of May when she began to have abdominal distention and severe pain. She was taken to an [facility] where she was found to have perforated diverticulitis with multiple perforations of the L colon, for which she underwent an exploratory laparotomy, L colectomy and Hartmanns on 5/30/19. Her hospital course was complicated by obstruction for which she underwent return to OR for adhesiolysis on 6/19/19. Refeeding started 6/23/19, abdominal drains removed 6/24/19, and bladder catheter removed 6/25/19. Patient was on Meropenem, vancomycin, metronidazole and caspofungin. Pt was transferred to [facility] via [facility] 7/2/19 for followup.  Seen by Dr Navarro.   Presents for ERAS , Open closure of Halley, cystoscopy insertion of ureteral catheters  12/10/19.          s/p halley's reversal and diverting loop ileostomy 12/10. Patient tolerated surgery, recovered in PACU and was transferred to the floor. Patient's NGT and maier were removed on the floor and diet was advanced as tolerated. Ostomy teaching was provided for new ileostomy. PCEA was removed 12/13.        At time of discharge, pt was tolerating a regular diet, voiding spontaneously, ostomy functioning, ambulating, and pain was well-controlled. Patient and family felt ready for discharge. Ms. 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 She states that she was on an [place] at the end of May when she began to have abdominal distention and severe pain. She was taken to an [facility] where she was found to have perforated diverticulitis with multiple perforations of the L colon, for which she underwent an exploratory laparotomy, L colectomy and Hartmanns on 5/30/19. Her hospital course was complicated by obstruction for which she underwent return to OR for adhesiolysis on 6/19/19. Refeeding started 6/23/19, abdominal drains removed 6/24/19, and bladder catheter removed 6/25/19. Patient was on Meropenem, vancomycin, metronidazole and caspofungin. Pt was transferred to [facility] via [facility] 7/2/19 for followup.  Seen by Dr Navarro.   Presents for ERAS , Open closure of Halley, cystoscopy insertion of ureteral catheters  12/10/19.          s/p halley's reversal and diverting loop ileostomy 12/10. Patient tolerated surgery, recovered in PACU and was transferred to the floor. Patient's NGT and maier were removed on the floor and diet was advanced as tolerated. Ostomy teaching was provided for new ileostomy. PCEA was removed 12/13.        Patient was febrile to 100.4 on 12/12PM, and tachycardic to max of 107 bpm. Fever workup was sent: BCx 12/13 no growth to date, CXR no acute findings, UA negative, ECG normal sinus, WBC WNL. Patient has been afebrile for over 24 hours at the time of discharge. Patient's HR has been WNL since.        At time of discharge, pt was tolerating a regular diet, voiding spontaneously, ostomy functioning, ambulating, and pain was well-controlled. Patient and family felt ready for discharge. Ms. 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 She states that she was on an [place] cruise at the end of May when she began to have abdominal distention and severe pain. She was taken to an [facility] where she was found to have perforated diverticulitis with multiple perforations of the L colon, for which she underwent an exploratory laparotomy, L colectomy and Hartmanns on 5/30/19. Her hospital course was complicated by obstruction for which she underwent return to OR for adhesiolysis on 6/19/19. Refeeding started 6/23/19, abdominal drains removed 6/24/19, and bladder catheter removed 6/25/19. Patient was on Meropenem, vancomycin, metronidazole and caspofungin. Pt was transferred to [facility] via [facility] 7/2/19 for followup.  Seen by Dr Navarro.   Presents for ERAS , Open closure of Halley, cystoscopy insertion of ureteral catheters  12/10/19.          s/p halley's reversal and diverting loop ileostomy 12/10. Patient tolerated surgery, recovered in PACU and was transferred to the floor. Patient's NGT and maier were removed on the floor and diet was advanced as tolerated. Ostomy teaching was provided for new ileostomy. PCEA was removed 12/13.        Patient was febrile to 100.4 on 12/12PM, and tachycardic to max of 107 bpm. Fever workup was sent: BCx 12/13 no growth to date, CXR no acute findings, UA negative, ECG normal sinus, WBC WNL. Patient has been afebrile for over 24 hours at the time of discharge. Patient's HR has been WNL since.        Patient was instructed to measure ostomy output, and if it is > 1 L, to start imodium x 2 tabs TID.        At time of discharge, pt was tolerating a regular diet, voiding spontaneously, ostomy functioning, ambulating, and pain was well-controlled. Patient and family felt ready for discharge.

## 2019-12-12 NOTE — DISCHARGE NOTE PROVIDER - NSDCCPTREATMENT_GEN_ALL_CORE_FT
PRINCIPAL PROCEDURE  Procedure: Reversal, Carolyn procedure  Findings and Treatment:       SECONDARY PROCEDURE  Procedure: Open loop ileostomy  Findings and Treatment:

## 2019-12-12 NOTE — DISCHARGE NOTE PROVIDER - NSDCFUADDINST_GEN_ALL_CORE_FT
Please follow up with Dr. Navarro within 2 weeks. Please avoid heavy lifting until follow up appointment. Keep incisions clean, you may shower and allow water and soap to wash over incision sites, pat dry. Please do not rub incisions. Do not submerge incision sites in water until follow up appointment. Please follow ostomy care instructions as provided. Please follow up with Dr. Navarro within 2 weeks. Please avoid heavy lifting until follow up appointment. Keep incisions clean, you may shower and allow water and soap to wash over incision sites, pat dry. Please do not rub incisions. Do not submerge incision sites in water until follow up appointment. Please follow ostomy care instructions as provided.  Please measure ostomy output. If it is greater than 1 Liter, please start taking imodium 2 tablets, three times a day.

## 2019-12-12 NOTE — DISCHARGE NOTE PROVIDER - NSDCMRMEDTOKEN_GEN_ALL_CORE_FT
ALPRAZolam 0.5 mg oral tablet: 0.5 tab(s) orally 2 times a day  biotin: orally once a day  cyclobenzaprine 10 mg oral tablet: 1 tab(s) orally 2 times a day  fenofibrate 48 mg oral tablet: 1 tab(s) orally once a day  triamterene-hydrochlorothiazide 37.5 mg-25 mg oral capsule: 1 cap(s) orally once a day acetaminophen 325 mg oral tablet: 3 tab(s) orally every 6 hours, As needed, Mild Pain (1 - 3)  ALPRAZolam 0.5 mg oral tablet: 0.5 tab(s) orally 2 times a day  biotin: orally once a day  cyclobenzaprine 10 mg oral tablet: 1 tab(s) orally 2 times a day  fenofibrate 48 mg oral tablet: 1 tab(s) orally once a day  oxyCODONE 5 mg oral tablet: 1 tab(s) orally every 4 hours, As Needed -Severe Pain (7 - 10) MDD:6  triamterene-hydrochlorothiazide 37.5 mg-25 mg oral capsule: 1 cap(s) orally once a day acetaminophen 325 mg oral tablet: 3 tab(s) orally every 6 hours, As needed, Mild Pain (1 - 3)  ALPRAZolam 0.5 mg oral tablet: 0.5 tab(s) orally 2 times a day  biotin: orally once a day  cyclobenzaprine 10 mg oral tablet: 1 tab(s) orally 2 times a day  fenofibrate 48 mg oral tablet: 1 tab(s) orally once a day  Imodium 2 mg oral capsule: 2 cap(s) orally 3 times a day (with meals), As Needed if ostomy output is greater than 1 liter  oxyCODONE 5 mg oral tablet: 1 tab(s) orally every 4 hours, As Needed -Severe Pain (7 - 10) MDD:6  triamterene-hydrochlorothiazide 37.5 mg-25 mg oral capsule: 1 cap(s) orally once a day

## 2019-12-12 NOTE — PROGRESS NOTE ADULT - ASSESSMENT
59y Female s/p Carolyn reversal extensive FRIDA, appendectomy, and creation of loop ileostomy 12/10.      Plan:  - c/d PCEA, start oral pain meds  - adv to LRD  - DVT ppx  - OOB and ambulating as tolerated  - F/u AM labs.  - PT, ostomy team  - dispo home with no PT, requesting VNS    [id number]

## 2019-12-12 NOTE — DISCHARGE NOTE PROVIDER - NSDCCPCAREPLAN_GEN_ALL_CORE_FT
PRINCIPAL DISCHARGE DIAGNOSIS  Diagnosis: S/P ileostomy  Assessment and Plan of Treatment:       SECONDARY DISCHARGE DIAGNOSES  Diagnosis: History of colostomy reversal  Assessment and Plan of Treatment:

## 2019-12-13 LAB
ANION GAP SERPL CALC-SCNC: 12 MMOL/L — SIGNIFICANT CHANGE UP (ref 5–17)
APPEARANCE UR: CLEAR — SIGNIFICANT CHANGE UP
APTT BLD: 27.3 SEC — LOW (ref 27.5–36.3)
BACTERIA # UR AUTO: NEGATIVE — SIGNIFICANT CHANGE UP
BILIRUB UR-MCNC: NEGATIVE — SIGNIFICANT CHANGE UP
BUN SERPL-MCNC: 7 MG/DL — SIGNIFICANT CHANGE UP (ref 7–23)
CALCIUM SERPL-MCNC: 8.1 MG/DL — LOW (ref 8.4–10.5)
CHLORIDE SERPL-SCNC: 100 MMOL/L — SIGNIFICANT CHANGE UP (ref 96–108)
CO2 SERPL-SCNC: 24 MMOL/L — SIGNIFICANT CHANGE UP (ref 22–31)
COLOR SPEC: SIGNIFICANT CHANGE UP
CREAT SERPL-MCNC: 0.67 MG/DL — SIGNIFICANT CHANGE UP (ref 0.5–1.3)
DIFF PNL FLD: NEGATIVE — SIGNIFICANT CHANGE UP
EPI CELLS # UR: 3 /HPF — SIGNIFICANT CHANGE UP
GLUCOSE SERPL-MCNC: 85 MG/DL — SIGNIFICANT CHANGE UP (ref 70–99)
GLUCOSE UR QL: NEGATIVE — SIGNIFICANT CHANGE UP
HCT VFR BLD CALC: 30 % — LOW (ref 34.5–45)
HGB BLD-MCNC: 9.3 G/DL — LOW (ref 11.5–15.5)
HYALINE CASTS # UR AUTO: 2 /LPF — SIGNIFICANT CHANGE UP (ref 0–2)
INR BLD: 1.07 RATIO — SIGNIFICANT CHANGE UP (ref 0.88–1.16)
KETONES UR-MCNC: NEGATIVE — SIGNIFICANT CHANGE UP
LEUKOCYTE ESTERASE UR-ACNC: ABNORMAL
MAGNESIUM SERPL-MCNC: 1.9 MG/DL — SIGNIFICANT CHANGE UP (ref 1.6–2.6)
MCHC RBC-ENTMCNC: 27.4 PG — SIGNIFICANT CHANGE UP (ref 27–34)
MCHC RBC-ENTMCNC: 31 GM/DL — LOW (ref 32–36)
MCV RBC AUTO: 88.5 FL — SIGNIFICANT CHANGE UP (ref 80–100)
NITRITE UR-MCNC: NEGATIVE — SIGNIFICANT CHANGE UP
NRBC # BLD: 0 /100 WBCS — SIGNIFICANT CHANGE UP (ref 0–0)
PH UR: 6.5 — SIGNIFICANT CHANGE UP (ref 5–8)
PHOSPHATE SERPL-MCNC: 2.9 MG/DL — SIGNIFICANT CHANGE UP (ref 2.5–4.5)
PLATELET # BLD AUTO: 159 K/UL — SIGNIFICANT CHANGE UP (ref 150–400)
POTASSIUM SERPL-MCNC: 3.9 MMOL/L — SIGNIFICANT CHANGE UP (ref 3.5–5.3)
POTASSIUM SERPL-SCNC: 3.9 MMOL/L — SIGNIFICANT CHANGE UP (ref 3.5–5.3)
PROT UR-MCNC: SIGNIFICANT CHANGE UP
PROTHROM AB SERPL-ACNC: 12.3 SEC — SIGNIFICANT CHANGE UP (ref 10–12.9)
RBC # BLD: 3.39 M/UL — LOW (ref 3.8–5.2)
RBC # FLD: 14.2 % — SIGNIFICANT CHANGE UP (ref 10.3–14.5)
RBC CASTS # UR COMP ASSIST: 10 /HPF — HIGH (ref 0–4)
SODIUM SERPL-SCNC: 136 MMOL/L — SIGNIFICANT CHANGE UP (ref 135–145)
SP GR SPEC: 1.01 — SIGNIFICANT CHANGE UP (ref 1.01–1.02)
UROBILINOGEN FLD QL: NEGATIVE — SIGNIFICANT CHANGE UP
WBC # BLD: 7.96 K/UL — SIGNIFICANT CHANGE UP (ref 3.8–10.5)
WBC # FLD AUTO: 7.96 K/UL — SIGNIFICANT CHANGE UP (ref 3.8–10.5)
WBC UR QL: 12 /HPF — HIGH (ref 0–5)

## 2019-12-13 PROCEDURE — 93010 ELECTROCARDIOGRAM REPORT: CPT

## 2019-12-13 PROCEDURE — 74177 CT ABD & PELVIS W/CONTRAST: CPT | Mod: 26

## 2019-12-13 RX ORDER — ACETAMINOPHEN 500 MG
975 TABLET ORAL EVERY 6 HOURS
Refills: 0 | Status: DISCONTINUED | OUTPATIENT
Start: 2019-12-13 | End: 2019-12-15

## 2019-12-13 RX ORDER — TRIAMTERENE/HYDROCHLOROTHIAZID 75 MG-50MG
1 TABLET ORAL DAILY
Refills: 0 | Status: DISCONTINUED | OUTPATIENT
Start: 2019-12-13 | End: 2019-12-15

## 2019-12-13 RX ORDER — DIPHENHYDRAMINE HCL 50 MG
25 CAPSULE ORAL ONCE
Refills: 0 | Status: COMPLETED | OUTPATIENT
Start: 2019-12-13 | End: 2019-12-13

## 2019-12-13 RX ORDER — ALPRAZOLAM 0.25 MG
0.5 TABLET ORAL AT BEDTIME
Refills: 0 | Status: DISCONTINUED | OUTPATIENT
Start: 2019-12-13 | End: 2019-12-15

## 2019-12-13 RX ORDER — OXYCODONE HYDROCHLORIDE 5 MG/1
5 TABLET ORAL EVERY 6 HOURS
Refills: 0 | Status: DISCONTINUED | OUTPATIENT
Start: 2019-12-13 | End: 2019-12-15

## 2019-12-13 RX ORDER — CYCLOBENZAPRINE HYDROCHLORIDE 10 MG/1
20 TABLET, FILM COATED ORAL AT BEDTIME
Refills: 0 | Status: DISCONTINUED | OUTPATIENT
Start: 2019-12-13 | End: 2019-12-15

## 2019-12-13 RX ORDER — FENOFIBRATE,MICRONIZED 130 MG
48 CAPSULE ORAL DAILY
Refills: 0 | Status: DISCONTINUED | OUTPATIENT
Start: 2019-12-13 | End: 2019-12-15

## 2019-12-13 RX ORDER — OXYCODONE HYDROCHLORIDE 5 MG/1
5 TABLET ORAL ONCE
Refills: 0 | Status: DISCONTINUED | OUTPATIENT
Start: 2019-12-13 | End: 2019-12-13

## 2019-12-13 RX ORDER — OXYCODONE HYDROCHLORIDE 5 MG/1
10 TABLET ORAL EVERY 6 HOURS
Refills: 0 | Status: DISCONTINUED | OUTPATIENT
Start: 2019-12-13 | End: 2019-12-15

## 2019-12-13 RX ADMIN — OXYCODONE HYDROCHLORIDE 5 MILLIGRAM(S): 5 TABLET ORAL at 17:38

## 2019-12-13 RX ADMIN — Medication 975 MILLIGRAM(S): at 13:19

## 2019-12-13 RX ADMIN — HEPARIN SODIUM 5000 UNIT(S): 5000 INJECTION INTRAVENOUS; SUBCUTANEOUS at 00:35

## 2019-12-13 RX ADMIN — Medication 25 MILLIGRAM(S): at 16:42

## 2019-12-13 RX ADMIN — OXYCODONE HYDROCHLORIDE 5 MILLIGRAM(S): 5 TABLET ORAL at 19:47

## 2019-12-13 RX ADMIN — Medication 0.5 MILLIGRAM(S): at 21:24

## 2019-12-13 RX ADMIN — HEPARIN SODIUM 5000 UNIT(S): 5000 INJECTION INTRAVENOUS; SUBCUTANEOUS at 07:29

## 2019-12-13 RX ADMIN — Medication 400 MILLIGRAM(S): at 06:00

## 2019-12-13 RX ADMIN — OXYCODONE HYDROCHLORIDE 5 MILLIGRAM(S): 5 TABLET ORAL at 17:08

## 2019-12-13 RX ADMIN — Medication 975 MILLIGRAM(S): at 13:49

## 2019-12-13 RX ADMIN — HEPARIN SODIUM 5000 UNIT(S): 5000 INJECTION INTRAVENOUS; SUBCUTANEOUS at 16:42

## 2019-12-13 RX ADMIN — Medication 1000 MILLIGRAM(S): at 06:30

## 2019-12-13 RX ADMIN — OXYCODONE HYDROCHLORIDE 5 MILLIGRAM(S): 5 TABLET ORAL at 20:17

## 2019-12-13 NOTE — PROGRESS NOTE ADULT - SUBJECTIVE AND OBJECTIVE BOX
GENERAL SURGERY PROGRESS NOTE    SUBJECTIVE  Patient seen and examined. No acute events overnight.. Reports tolerating LRD without nausea, vomiting, passing flatus, having bowel movements, voiding without issues, has been ambulating and out of bed. Denies fever, chills, SOB, chest pain.         OBJECTIVE    PHYSICAL EXAM  General: Appears well, NAD  CHEST: breathing comfortably  CV: appears well perfused  Abdomen: soft, nontender, nondistended, no rebound or guarding, incisions c/d/i, ostomy pink viable  Extremities: Grossly symmetric          Vital Signs:  Vital Signs Last 24 Hrs  T(C): 37.2 (12 Dec 2019 23:29), Max: 38 (12 Dec 2019 22:00)  T(F): 98.9 (12 Dec 2019 23:29), Max: 100.4 (12 Dec 2019 22:00)  HR: 85 (13 Dec 2019 00:30) (85 - 107)  BP: 110/68 (12 Dec 2019 23:29) (109/70 - 130/78)  BP(mean): --  RR: 18 (12 Dec 2019 23:29) (18 - 18)  SpO2: 96% (12 Dec 2019 23:29) (94% - 99%)    CAPILLARY BLOOD GLUCOSE          I&O's Detail    11 Dec 2019 07:01  -  12 Dec 2019 07:00  --------------------------------------------------------  IN:    dextrose 5% + sodium chloride 0.45% with potassium chloride 20 mEq/L: 1200 mL    IV PiggyBack: 100 mL    Oral Fluid: 160 mL  Total IN: 1460 mL    OUT:    Ileostomy: 350 mL    Voided: 600 mL  Total OUT: 950 mL    Total NET: 510 mL      12 Dec 2019 07:01  -  13 Dec 2019 04:11  --------------------------------------------------------  IN:    Oral Fluid: 340 mL    Solution: 500 mL    Solution: 100 mL  Total IN: 940 mL    OUT:    Ileostomy: 425 mL    Voided: 1200 mL  Total OUT: 1625 mL    Total NET: -685 mL          MEDICATIONS  (STANDING):  acetaminophen  IVPB .. 1000 milliGRAM(s) IV Intermittent once  heparin  Injectable 5000 Unit(s) SubCutaneous every 8 hours  hydromorphone (10 MICROgram(s)/mL) + bupivacaine 0.0625% in 0.9% Sodium Chloride PCEA 250 milliLiter(s) Epidural PCA Continuous  influenza   Vaccine 0.5 milliLiter(s) IntraMuscular once    MEDICATIONS  (PRN):  hydromorphone (10 MICROgram(s)/mL) + bupivacaine 0.0625% in 0.9% Sodium Chloride PCEA Rescue Clinician  Bolus 5 milliLiter(s) Epidural every 15 minutes PRN for Pain Score greater than 6  naloxone Injectable 0.1 milliGRAM(s) IV Push every 3 minutes PRN For ANY of the following changes in patient status:  A. RR LESS THAN 10 breaths per minute, B. Oxygen saturation LESS THAN 90%, C. Sedation score of 6  ondansetron Injectable 4 milliGRAM(s) IV Push every 6 hours PRN Nausea          Labs:    12-12    130<L>  |  96  |  8   ----------------------------<  106<H>  3.6   |  23  |  0.65    Ca    8.0<L>      12 Dec 2019 23:13  Phos  1.8     12-12  Mg     2.0     12-12                              9.6    9.62  )-----------( 186      ( 12 Dec 2019 23:13 )             30.2     PT/INR - ( 12 Dec 2019 08:26 )   PT: 12.6 sec;   INR: 1.11 ratio         PTT - ( 12 Dec 2019 08:26 )  PTT:25.2 sec    Imaging: GENERAL SURGERY PROGRESS NOTE    SUBJECTIVE  Patient seen and examined. Was febrile and tachy overnight, fever w/u sent. Reports tolerating LRD without nausea, vomiting, passing flatus, having bowel movements, voiding without issues, has been ambulating and out of bed. Denies fever, chills, SOB, chest pain.         OBJECTIVE    PHYSICAL EXAM  General: Appears well, NAD  CHEST: breathing comfortably  CV: appears well perfused  Abdomen: soft, nontender, nondistended, no rebound or guarding, incisions c/d/i, ostomy pink viable  Extremities: Grossly symmetric          Vital Signs:  Vital Signs Last 24 Hrs  T(C): 37.2 (12 Dec 2019 23:29), Max: 38 (12 Dec 2019 22:00)  T(F): 98.9 (12 Dec 2019 23:29), Max: 100.4 (12 Dec 2019 22:00)  HR: 85 (13 Dec 2019 00:30) (85 - 107)  BP: 110/68 (12 Dec 2019 23:29) (109/70 - 130/78)  BP(mean): --  RR: 18 (12 Dec 2019 23:29) (18 - 18)  SpO2: 96% (12 Dec 2019 23:29) (94% - 99%)    CAPILLARY BLOOD GLUCOSE          I&O's Detail    11 Dec 2019 07:01  -  12 Dec 2019 07:00  --------------------------------------------------------  IN:    dextrose 5% + sodium chloride 0.45% with potassium chloride 20 mEq/L: 1200 mL    IV PiggyBack: 100 mL    Oral Fluid: 160 mL  Total IN: 1460 mL    OUT:    Ileostomy: 350 mL    Voided: 600 mL  Total OUT: 950 mL    Total NET: 510 mL      12 Dec 2019 07:01  -  13 Dec 2019 04:11  --------------------------------------------------------  IN:    Oral Fluid: 340 mL    Solution: 500 mL    Solution: 100 mL  Total IN: 940 mL    OUT:    Ileostomy: 425 mL    Voided: 1200 mL  Total OUT: 1625 mL    Total NET: -685 mL          MEDICATIONS  (STANDING):  acetaminophen  IVPB .. 1000 milliGRAM(s) IV Intermittent once  heparin  Injectable 5000 Unit(s) SubCutaneous every 8 hours  hydromorphone (10 MICROgram(s)/mL) + bupivacaine 0.0625% in 0.9% Sodium Chloride PCEA 250 milliLiter(s) Epidural PCA Continuous  influenza   Vaccine 0.5 milliLiter(s) IntraMuscular once    MEDICATIONS  (PRN):  hydromorphone (10 MICROgram(s)/mL) + bupivacaine 0.0625% in 0.9% Sodium Chloride PCEA Rescue Clinician  Bolus 5 milliLiter(s) Epidural every 15 minutes PRN for Pain Score greater than 6  naloxone Injectable 0.1 milliGRAM(s) IV Push every 3 minutes PRN For ANY of the following changes in patient status:  A. RR LESS THAN 10 breaths per minute, B. Oxygen saturation LESS THAN 90%, C. Sedation score of 6  ondansetron Injectable 4 milliGRAM(s) IV Push every 6 hours PRN Nausea          Labs:    12-12    130<L>  |  96  |  8   ----------------------------<  106<H>  3.6   |  23  |  0.65    Ca    8.0<L>      12 Dec 2019 23:13  Phos  1.8     12-12  Mg     2.0     12-12                              9.6    9.62  )-----------( 186      ( 12 Dec 2019 23:13 )             30.2     PT/INR - ( 12 Dec 2019 08:26 )   PT: 12.6 sec;   INR: 1.11 ratio         PTT - ( 12 Dec 2019 08:26 )  PTT:25.2 sec    Imaging:

## 2019-12-13 NOTE — PROGRESS NOTE ADULT - SUBJECTIVE AND OBJECTIVE BOX
Day 3 of Anesthesia Pain Management Service    SUBJECTIVE: Patient doing well with PCEA    Pain Scale Score:   Refer to charted pain scores    THERAPY:  [X] Epidural Bupivacaine 0.0625% and Hydromorphone         [X] 10 micrograms/mL 	[ ] 5 micrograms/mL  [ ] Epidural Ropivacaine 0.2% plain – 1 mg/mL    Demand dose: 3 mL  Lockout: 15 minutes  Continuous Rate: 4 mL/hr    MEDICATIONS  (STANDING):  heparin  Injectable 5000 Unit(s) SubCutaneous every 8 hours  hydromorphone (10 MICROgram(s)/mL) + bupivacaine 0.0625% in 0.9% Sodium Chloride PCEA 250 milliLiter(s) Epidural PCA Continuous  influenza   Vaccine 0.5 milliLiter(s) IntraMuscular once    MEDICATIONS  (PRN):  hydromorphone (10 MICROgram(s)/mL) + bupivacaine 0.0625% in 0.9% Sodium Chloride PCEA Rescue Clinician  Bolus 5 milliLiter(s) Epidural every 15 minutes PRN for Pain Score greater than 6  naloxone Injectable 0.1 milliGRAM(s) IV Push every 3 minutes PRN For ANY of the following changes in patient status:  A. RR LESS THAN 10 breaths per minute, B. Oxygen saturation LESS THAN 90%, C. Sedation score of 6  ondansetron Injectable 4 milliGRAM(s) IV Push every 6 hours PRN Nausea      OBJECTIVE:    Assessment of Catheter Site:    [X] Epidural 	  [X] Dressing intact	[X] Site non-tender	[X] Site without erythema, discharge, edema  [X] Epidural tubing and connection checked	[X] Gross neurological exam within normal limits  [ ] Catheter removed – tip intact		[X] Afebrile	            [ ] Febrile: ___    PT/INR - ( 13 Dec 2019 04:53 )   PT: 12.3 sec;   INR: 1.07 ratio         PTT - ( 13 Dec 2019 04:53 )  PTT:27.3 sec                      9.3    7.96  )-----------( 159      ( 13 Dec 2019 04:53 )             30.0     Vital Signs Last 24 Hrs  T(C): 36.6 (12-13-19 @ 09:58), Max: 38 (12-12-19 @ 22:00)  T(F): 97.9 (12-13-19 @ 09:58), Max: 100.4 (12-12-19 @ 22:00)  HR: 80 (12-13-19 @ 09:58) (75 - 107)  BP: 132/76 (12-13-19 @ 09:58) (110/68 - 132/76)  BP(mean): --  RR: 18 (12-13-19 @ 09:58) (18 - 18)  SpO2: 97% (12-13-19 @ 09:58) (96% - 99%)      Sedation Score:	[X] Alert	[ ] Drowsy	[ ] Arousable  [ ] Asleep     [ ] Unresponsive    Side Effects:	[X] None	[ ] Nausea	[ ] Vomiting   [ ] Pruritus  		[ ] Weakness     [ ] Numbness	[ ] Other:    ASSESSMENT/ PLAN:    Therapy:	[X] Continue   [ ] Discontinue   [ ] Change to PRN Analgesics   [ ] Change to PCA    Documentation and Verification of current medications:  [X] Done	[ ] Not done, not eligible, reason:    COMMENTS: Plan to D\C PCEA. Anticoagulation status reviewed. Transition to po analgesics prn

## 2019-12-14 ENCOUNTER — TRANSCRIPTION ENCOUNTER (OUTPATIENT)
Age: 59
End: 2019-12-14

## 2019-12-14 RX ORDER — OXYCODONE HYDROCHLORIDE 5 MG/1
1 TABLET ORAL
Qty: 10 | Refills: 0
Start: 2019-12-14

## 2019-12-14 RX ORDER — ACETAMINOPHEN 500 MG
3 TABLET ORAL
Qty: 0 | Refills: 0 | DISCHARGE
Start: 2019-12-14

## 2019-12-14 RX ADMIN — HEPARIN SODIUM 5000 UNIT(S): 5000 INJECTION INTRAVENOUS; SUBCUTANEOUS at 15:25

## 2019-12-14 RX ADMIN — OXYCODONE HYDROCHLORIDE 10 MILLIGRAM(S): 5 TABLET ORAL at 00:36

## 2019-12-14 RX ADMIN — HEPARIN SODIUM 5000 UNIT(S): 5000 INJECTION INTRAVENOUS; SUBCUTANEOUS at 09:01

## 2019-12-14 RX ADMIN — OXYCODONE HYDROCHLORIDE 10 MILLIGRAM(S): 5 TABLET ORAL at 00:06

## 2019-12-14 RX ADMIN — OXYCODONE HYDROCHLORIDE 10 MILLIGRAM(S): 5 TABLET ORAL at 21:26

## 2019-12-14 RX ADMIN — OXYCODONE HYDROCHLORIDE 10 MILLIGRAM(S): 5 TABLET ORAL at 15:50

## 2019-12-14 RX ADMIN — Medication 1 TABLET(S): at 05:34

## 2019-12-14 RX ADMIN — Medication 0.5 MILLIGRAM(S): at 21:26

## 2019-12-14 RX ADMIN — HEPARIN SODIUM 5000 UNIT(S): 5000 INJECTION INTRAVENOUS; SUBCUTANEOUS at 00:02

## 2019-12-14 RX ADMIN — OXYCODONE HYDROCHLORIDE 10 MILLIGRAM(S): 5 TABLET ORAL at 15:24

## 2019-12-14 RX ADMIN — OXYCODONE HYDROCHLORIDE 10 MILLIGRAM(S): 5 TABLET ORAL at 06:06

## 2019-12-14 RX ADMIN — OXYCODONE HYDROCHLORIDE 10 MILLIGRAM(S): 5 TABLET ORAL at 21:56

## 2019-12-14 RX ADMIN — Medication 48 MILLIGRAM(S): at 12:35

## 2019-12-14 RX ADMIN — OXYCODONE HYDROCHLORIDE 10 MILLIGRAM(S): 5 TABLET ORAL at 06:36

## 2019-12-14 RX ADMIN — INFLUENZA VIRUS VACCINE 0.5 MILLILITER(S): 15; 15; 15; 15 SUSPENSION INTRAMUSCULAR at 18:26

## 2019-12-14 RX ADMIN — HEPARIN SODIUM 5000 UNIT(S): 5000 INJECTION INTRAVENOUS; SUBCUTANEOUS at 23:09

## 2019-12-14 NOTE — DISCHARGE NOTE NURSING/CASE MANAGEMENT/SOCIAL WORK - PATIENT PORTAL LINK FT
You can access the FollowMyHealth Patient Portal offered by [facility] by registering at the following website: [email or web address]. By joining [facility]’s FollowMyHealth portal, you will also be able to view your health information using other applications (apps) compatible with our system.

## 2019-12-14 NOTE — PROGRESS NOTE ADULT - ASSESSMENT
59y Female s/p Carolyn reversal extensive FRIDA, appendectomy, and creation of loop ileostomy 12/10.    Plan:  - pain control  - LRD  - DVT ppx  - OOB and ambulating as tolerated  - F/u AM labs.  - f/u bcx  - PT, ostomy team  - dispo home with no PT, requesting VNS    [id number] 59y Female s/p Carolyn reversal extensive FRIDA, appendectomy, and creation of loop ileostomy 12/10.    Plan:  - pain control  - LRD  - DVT ppx  - OOB and ambulating as tolerated  - F/u AM labs.  - f/u bcx  - PT, ostomy team  - dispo home with no PT, requesting VNS  - penrose out POD5 or at discharge    [id number]

## 2019-12-14 NOTE — PROGRESS NOTE ADULT - SUBJECTIVE AND OBJECTIVE BOX
GENERAL SURGERY PROGRESS NOTE    SUBJECTIVE  Patient seen and examined. No acute events overnight. Reports tolerating diet without nausea, vomiting, +/+ ostomy, voiding without issues, have been ambulating and out of bed. Denies fever, chills, SOB, chest pain.     PCEA  removed, tolerated PO pain control  CT  negative for intra abdominal collections  afebrile since  late PM    OBJECTIVE    PHYSICAL EXAM  General: Appears well, NAD  CHEST: breathing comfortably  CV: appears well perfused  Abdomen: soft, nontender, nondistended, no rebound or guarding, ostomy pink viable +/+, incisions c/d/i  Extremities: Grossly symmetric    T(C): 37.3 (19 @ 01:34), Max: 37.3 (19 @ 21:51)  HR: 89 (19 @ 01:34) (80 - 93)  BP: 154/82 (19 @ 01:34) (132/76 - 154/82)  RR: 18 (19 @ 01:34) (18 - 18)  SpO2: 95% (19 @ 01:34) (95% - 99%)    19 @ 07:01  -  19 @ 07:00  --------------------------------------------------------  IN: 1140 mL / OUT: 1925 mL / NET: -785 mL    19 @ 07:01  -  19 @ 05:10  --------------------------------------------------------  IN: 940 mL / OUT: 1901 mL / NET: -961 mL        MEDICATIONS  acetaminophen   Tablet .. 975 milliGRAM(s) Oral every 6 hours PRN  ALPRAZolam 0.5 milliGRAM(s) Oral at bedtime  cyclobenzaprine 20 milliGRAM(s) Oral at bedtime PRN  fenofibrate Tablet 48 milliGRAM(s) Oral daily  heparin  Injectable 5000 Unit(s) SubCutaneous every 8 hours  influenza   Vaccine 0.5 milliLiter(s) IntraMuscular once  oxyCODONE    IR 5 milliGRAM(s) Oral every 6 hours PRN  oxyCODONE    IR 10 milliGRAM(s) Oral every 6 hours PRN  triamterene 37.5 mG/hydrochlorothiazide 25 mG Tablet 1 Tablet(s) Oral daily      LABS                        9.3    7.96  )-----------( 159      ( 13 Dec 2019 04:53 )             30.0     12-    136  |  100  |  7   ----------------------------<  85  3.9   |  24  |  0.67    Ca    8.1<L>      13 Dec 2019 04:53  Phos  2.9     12-  Mg     1.9     12-      PT/INR - ( 13 Dec 2019 04:53 )   PT: 12.3 sec;   INR: 1.07 ratio         PTT - ( 13 Dec 2019 04:53 )  PTT:27.3 sec  Urinalysis Basic - ( 13 Dec 2019 01:48 )    Color: Light Yellow / Appearance: Clear / S.014 / pH: x  Gluc: x / Ketone: Negative  / Bili: Negative / Urobili: Negative   Blood: x / Protein: Trace / Nitrite: Negative   Leuk Esterase: Small / RBC: 10 /hpf / WBC 12 /HPF   Sq Epi: x / Non Sq Epi: 3 /hpf / Bacteria: Negative        RADIOLOGY & ADDITIONAL STUDIES GENERAL SURGERY PROGRESS NOTE    SUBJECTIVE  Patient seen and examined. No acute events overnight. Reports tolerating diet without nausea, vomiting, +/+ ostomy, voiding without issues, have been ambulating and out of bed. Denies fever, chills, SOB, chest pain.     PCEA  removed, tolerated PO pain control  CT  negative for intra abdominal collections  afebrile since  late PM    OBJECTIVE    PHYSICAL EXAM  General: Appears well, NAD  CHEST: breathing comfortably  CV: appears well perfused  Abdomen: soft, nontender, nondistended, no rebound or guarding, ostomy pink viable +/+, incisions c/d/i, penrose  Extremities: Grossly symmetric    T(C): 37.3 (19 @ 01:34), Max: 37.3 (19 @ 21:51)  HR: 89 (19 @ :34) (80 - 93)  BP: 154/82 (19 @ 01:34) (132/76 - 154/82)  RR: 18 (19 @ :34) (18 - 18)  SpO2: 95% (19 @ 01:34) (95% - 99%)    19 @ 07:01  -  19 @ 07:00  --------------------------------------------------------  IN: 1140 mL / OUT: 1925 mL / NET: -785 mL    19 @ 07:01  -  19 @ 05:10  --------------------------------------------------------  IN: 940 mL / OUT: 1901 mL / NET: -961 mL        MEDICATIONS  acetaminophen   Tablet .. 975 milliGRAM(s) Oral every 6 hours PRN  ALPRAZolam 0.5 milliGRAM(s) Oral at bedtime  cyclobenzaprine 20 milliGRAM(s) Oral at bedtime PRN  fenofibrate Tablet 48 milliGRAM(s) Oral daily  heparin  Injectable 5000 Unit(s) SubCutaneous every 8 hours  influenza   Vaccine 0.5 milliLiter(s) IntraMuscular once  oxyCODONE    IR 5 milliGRAM(s) Oral every 6 hours PRN  oxyCODONE    IR 10 milliGRAM(s) Oral every 6 hours PRN  triamterene 37.5 mG/hydrochlorothiazide 25 mG Tablet 1 Tablet(s) Oral daily      LABS                        9.3    7.96  )-----------( 159      ( 13 Dec 2019 04:53 )             30.0     12-    136  |  100  |  7   ----------------------------<  85  3.9   |  24  |  0.67    Ca    8.1<L>      13 Dec 2019 04:53  Phos  2.9     12-13  Mg     1.9     12-13      PT/INR - ( 13 Dec 2019 04:53 )   PT: 12.3 sec;   INR: 1.07 ratio         PTT - ( 13 Dec 2019 04:53 )  PTT:27.3 sec  Urinalysis Basic - ( 13 Dec 2019 01:48 )    Color: Light Yellow / Appearance: Clear / S.014 / pH: x  Gluc: x / Ketone: Negative  / Bili: Negative / Urobili: Negative   Blood: x / Protein: Trace / Nitrite: Negative   Leuk Esterase: Small / RBC: 10 /hpf / WBC 12 /HPF   Sq Epi: x / Non Sq Epi: 3 /hpf / Bacteria: Negative        RADIOLOGY & ADDITIONAL STUDIES GENERAL SURGERY PROGRESS NOTE    SUBJECTIVE  Patient seen and examined. No acute events overnight. Reports tolerating diet without nausea, vomiting, +/+ ostomy, voiding without issues, have been ambulating and out of bed. Denies fever, chills, SOB, chest pain.     PCEA  removed, tolerated PO pain control  CT  negative for intra abdominal collections  afebrile since  late PM    OBJECTIVE    PHYSICAL EXAM  General: Appears well, NAD  CHEST: breathing comfortably  CV: appears well perfused  Abdomen: soft, nontender, nondistended, no rebound or guarding, ostomy pink viable +/+, incisions c/d/i, jose out  Extremities: Grossly symmetric    T(C): 37.3 (19 @ 01:34), Max: 37.3 (19 @ 21:51)  HR: 89 (19 @ :34) (80 - 93)  BP: 154/82 (19 @ 01:34) (132/76 - 154/82)  RR: 18 (19 @ :34) (18 - 18)  SpO2: 95% (19 @ 01:34) (95% - 99%)    19 @ 07:01  -  19 @ 07:00  --------------------------------------------------------  IN: 1140 mL / OUT: 1925 mL / NET: -785 mL    19 @ 07:01  -  19 @ 05:10  --------------------------------------------------------  IN: 940 mL / OUT: 1901 mL / NET: -961 mL        MEDICATIONS  acetaminophen   Tablet .. 975 milliGRAM(s) Oral every 6 hours PRN  ALPRAZolam 0.5 milliGRAM(s) Oral at bedtime  cyclobenzaprine 20 milliGRAM(s) Oral at bedtime PRN  fenofibrate Tablet 48 milliGRAM(s) Oral daily  heparin  Injectable 5000 Unit(s) SubCutaneous every 8 hours  influenza   Vaccine 0.5 milliLiter(s) IntraMuscular once  oxyCODONE    IR 5 milliGRAM(s) Oral every 6 hours PRN  oxyCODONE    IR 10 milliGRAM(s) Oral every 6 hours PRN  triamterene 37.5 mG/hydrochlorothiazide 25 mG Tablet 1 Tablet(s) Oral daily      LABS                        9.3    7.96  )-----------( 159      ( 13 Dec 2019 04:53 )             30.0     12-    136  |  100  |  7   ----------------------------<  85  3.9   |  24  |  0.67    Ca    8.1<L>      13 Dec 2019 04:53  Phos  2.9     12-13  Mg     1.9     12-13      PT/INR - ( 13 Dec 2019 04:53 )   PT: 12.3 sec;   INR: 1.07 ratio         PTT - ( 13 Dec 2019 04:53 )  PTT:27.3 sec  Urinalysis Basic - ( 13 Dec 2019 01:48 )    Color: Light Yellow / Appearance: Clear / S.014 / pH: x  Gluc: x / Ketone: Negative  / Bili: Negative / Urobili: Negative   Blood: x / Protein: Trace / Nitrite: Negative   Leuk Esterase: Small / RBC: 10 /hpf / WBC 12 /HPF   Sq Epi: x / Non Sq Epi: 3 /hpf / Bacteria: Negative        RADIOLOGY & ADDITIONAL STUDIES

## 2019-12-15 VITALS
TEMPERATURE: 98 F | HEART RATE: 80 BPM | OXYGEN SATURATION: 98 % | RESPIRATION RATE: 18 BRPM | DIASTOLIC BLOOD PRESSURE: 91 MMHG | SYSTOLIC BLOOD PRESSURE: 151 MMHG

## 2019-12-15 PROCEDURE — 83735 ASSAY OF MAGNESIUM: CPT

## 2019-12-15 PROCEDURE — C1758: CPT

## 2019-12-15 PROCEDURE — 88302 TISSUE EXAM BY PATHOLOGIST: CPT

## 2019-12-15 PROCEDURE — 97530 THERAPEUTIC ACTIVITIES: CPT

## 2019-12-15 PROCEDURE — 80048 BASIC METABOLIC PNL TOTAL CA: CPT

## 2019-12-15 PROCEDURE — 85730 THROMBOPLASTIN TIME PARTIAL: CPT

## 2019-12-15 PROCEDURE — 97161 PT EVAL LOW COMPLEX 20 MIN: CPT

## 2019-12-15 PROCEDURE — 85027 COMPLETE CBC AUTOMATED: CPT

## 2019-12-15 PROCEDURE — 85610 PROTHROMBIN TIME: CPT

## 2019-12-15 PROCEDURE — 74177 CT ABD & PELVIS W/CONTRAST: CPT

## 2019-12-15 PROCEDURE — 93005 ELECTROCARDIOGRAM TRACING: CPT

## 2019-12-15 PROCEDURE — 84100 ASSAY OF PHOSPHORUS: CPT

## 2019-12-15 PROCEDURE — C1765: CPT

## 2019-12-15 PROCEDURE — 82962 GLUCOSE BLOOD TEST: CPT

## 2019-12-15 PROCEDURE — C1889: CPT

## 2019-12-15 PROCEDURE — 71045 X-RAY EXAM CHEST 1 VIEW: CPT

## 2019-12-15 PROCEDURE — 81001 URINALYSIS AUTO W/SCOPE: CPT

## 2019-12-15 PROCEDURE — 90686 IIV4 VACC NO PRSV 0.5 ML IM: CPT

## 2019-12-15 PROCEDURE — 87040 BLOOD CULTURE FOR BACTERIA: CPT

## 2019-12-15 PROCEDURE — 88304 TISSUE EXAM BY PATHOLOGIST: CPT

## 2019-12-15 RX ORDER — LOPERAMIDE HCL 2 MG
2 TABLET ORAL
Qty: 0 | Refills: 0 | DISCHARGE

## 2019-12-15 RX ADMIN — Medication 1 TABLET(S): at 05:10

## 2019-12-15 RX ADMIN — OXYCODONE HYDROCHLORIDE 10 MILLIGRAM(S): 5 TABLET ORAL at 05:40

## 2019-12-15 RX ADMIN — OXYCODONE HYDROCHLORIDE 10 MILLIGRAM(S): 5 TABLET ORAL at 05:10

## 2019-12-15 NOTE — PROGRESS NOTE ADULT - ASSESSMENT
59y Female s/p Carolyn reversal extensive FRIDA, appendectomy, and creation of loop ileostomy 12/10.    Plan:  - pain control  - LRD  - DVT ppx  - OOB and ambulating as tolerated  - PT, ostomy team  - dispo home with no PT today    [id number]

## 2019-12-15 NOTE — PROGRESS NOTE ADULT - SUBJECTIVE AND OBJECTIVE BOX
GENERAL SURGERY PROGRESS NOTE    SUBJECTIVE  Patient seen and examined. No acute events overnight. Reports tolerating diet without nausea, vomiting, passing flatus, having bowel movements, voiding without issues, have been ambulating and out of bed. Denies fever, chills, SOB, chest pain.     discharge to home placed 12/14    OBJECTIVE    PHYSICAL EXAM  General: Appears well, NAD  CHEST: breathing comfortably  CV: appears well perfused  Abdomen: soft, nontender, nondistended, no rebound or guarding, staples c/d/i  Extremities: Grossly symmetric    T(C): 36.7 (12-15-19 @ 05:07), Max: 37.1 (12-14-19 @ 17:44)  HR: 80 (12-15-19 @ 05:07) (80 - 94)  BP: 151/91 (12-15-19 @ 05:07) (125/79 - 157/84)  RR: 18 (12-15-19 @ 05:07) (18 - 18)  SpO2: 98% (12-15-19 @ 05:07) (96% - 99%)    12-14-19 @ 07:01  -  12-15-19 @ 07:00  --------------------------------------------------------  IN: 910 mL / OUT: 355 mL / NET: 555 mL        MEDICATIONS  acetaminophen   Tablet .. 975 milliGRAM(s) Oral every 6 hours PRN  ALPRAZolam 0.5 milliGRAM(s) Oral at bedtime  cyclobenzaprine 20 milliGRAM(s) Oral at bedtime PRN  fenofibrate Tablet 48 milliGRAM(s) Oral daily  heparin  Injectable 5000 Unit(s) SubCutaneous every 8 hours  oxyCODONE    IR 5 milliGRAM(s) Oral every 6 hours PRN  oxyCODONE    IR 10 milliGRAM(s) Oral every 6 hours PRN  triamterene 37.5 mG/hydrochlorothiazide 25 mG Tablet 1 Tablet(s) Oral daily      LABS                RADIOLOGY & ADDITIONAL STUDIES

## 2019-12-15 NOTE — PROGRESS NOTE ADULT - ATTENDING COMMENTS
I saw and examined the pt and discussed the tx plan with the House Staff. I agree with the exam and plan as documented in the surgery resident's note from today.  Home today.  Kim Wetzel MD
I have seen and evaluated patient and discussed with team.  Low grade temp with tachycardia last night.  Feels well this morning.  Some right sided tenderness.  Will get CT because of extent of surgery and the fever.
I saw and examined the pt and discussed the tx plan with the House Staff. I agree with the exam and plan as documented in the surgery resident's note from today.  Feels well today, continue LRD.   Kim Wetzel MD
I have seen and evaluated patient and I agree with resident assessment and plan. Findings in decision making of surgery discussed with patient. All questions answered. Ostomy with gas and stool. NG tube clamping.

## 2019-12-16 PROBLEM — K56.609 UNSPECIFIED INTESTINAL OBSTRUCTION, UNSPECIFIED AS TO PARTIAL VERSUS COMPLETE OBSTRUCTION: Chronic | Status: ACTIVE | Noted: 2019-11-29

## 2019-12-16 PROBLEM — Z93.3 COLOSTOMY IN PLACE: Status: RESOLVED | Noted: 2019-07-09 | Resolved: 2019-12-16

## 2019-12-16 PROBLEM — M51.9 UNSPECIFIED THORACIC, THORACOLUMBAR AND LUMBOSACRAL INTERVERTEBRAL DISC DISORDER: Chronic | Status: ACTIVE | Noted: 2019-11-29

## 2019-12-16 PROBLEM — Q62.7 CONGENITAL VESICO-URETERO-RENAL REFLUX: Chronic | Status: ACTIVE | Noted: 2019-11-29

## 2019-12-16 PROBLEM — M19.90 UNSPECIFIED OSTEOARTHRITIS, UNSPECIFIED SITE: Chronic | Status: ACTIVE | Noted: 2019-11-29

## 2019-12-16 PROBLEM — Z87.19 HISTORY OF DIVERTICULITIS OF COLON: Status: RESOLVED | Noted: 2019-07-09 | Resolved: 2019-12-16

## 2019-12-16 PROBLEM — M54.5 LOW BACK PAIN: Chronic | Status: ACTIVE | Noted: 2019-11-29

## 2019-12-16 PROBLEM — K57.92 DIVERTICULITIS OF INTESTINE, PART UNSPECIFIED, WITHOUT PERFORATION OR ABSCESS WITHOUT BLEEDING: Chronic | Status: ACTIVE | Noted: 2019-11-29

## 2019-12-16 RX ORDER — AMOXICILLIN AND CLAVULANATE POTASSIUM 875; 125 MG/1; MG/1
875-125 TABLET, COATED ORAL
Qty: 10 | Refills: 0 | Status: DISCONTINUED | COMMUNITY
Start: 2019-12-02 | End: 2019-12-16

## 2019-12-16 RX ORDER — METRONIDAZOLE 250 MG/1
250 TABLET ORAL
Qty: 3 | Refills: 0 | Status: DISCONTINUED | COMMUNITY
Start: 2019-11-14 | End: 2019-12-16

## 2019-12-16 RX ORDER — CIPROFLOXACIN HYDROCHLORIDE 250 MG/1
250 TABLET, FILM COATED ORAL
Qty: 3 | Refills: 0 | Status: DISCONTINUED | COMMUNITY
Start: 2019-11-14 | End: 2019-12-16

## 2019-12-16 RX ORDER — SODIUM PICOSULFATE, MAGNESIUM OXIDE, AND ANHYDROUS CITRIC ACID 10; 3.5; 12 MG/160ML; G/160ML; G/160ML
10-3.5-12 MG-GM LIQUID ORAL
Qty: 1 | Refills: 0 | Status: DISCONTINUED | COMMUNITY
Start: 2019-10-07 | End: 2019-12-16

## 2019-12-17 LAB — SURGICAL PATHOLOGY STUDY: SIGNIFICANT CHANGE UP

## 2019-12-18 ENCOUNTER — APPOINTMENT (OUTPATIENT)
Dept: SURGERY | Facility: CLINIC | Age: 59
End: 2019-12-18
Payer: COMMERCIAL

## 2019-12-18 VITALS
OXYGEN SATURATION: 100 % | DIASTOLIC BLOOD PRESSURE: 96 MMHG | SYSTOLIC BLOOD PRESSURE: 165 MMHG | HEART RATE: 81 BPM | TEMPERATURE: 97.7 F | RESPIRATION RATE: 16 BRPM

## 2019-12-18 PROCEDURE — 99024 POSTOP FOLLOW-UP VISIT: CPT

## 2019-12-18 NOTE — REASON FOR VISIT
[FreeTextEntry1] : Closure of Carolyn procedure for perforated diverticulitis with diverting loop ileostomy.Appendectomy.Suture of seromuscular tear to small bowel. Repair of parastomal hernia.

## 2019-12-18 NOTE — ASSESSMENT
[FreeTextEntry1] : Patient doing well. Followup in 3-4 weeks. Staples were removed. Will setup CT with rectal contrast at that time.

## 2019-12-18 NOTE — PHYSICAL EXAM
[Abdomen Masses] : No abdominal masses [Abdomen Tenderness] : ~T No ~M abdominal tenderness [de-identified] : Healed wound, normal ostomy

## 2019-12-18 NOTE — HISTORY OF PRESENT ILLNESS
[FreeTextEntry1] : Marcie is a 59 year old female with a history of Carolyn procedure for perforated diverticulitis. She is status post closure of Carolyn procedure, appendectomy, suture of seromuscular tear to small bowel and repair of parastomal hernia. Ileostomy output thick\par Here today for an acute visit. Discomfort from staples.

## 2019-12-19 ENCOUNTER — APPOINTMENT (OUTPATIENT)
Dept: SURGERY | Facility: CLINIC | Age: 59
End: 2019-12-19

## 2019-12-19 DIAGNOSIS — Z87.19 PERSONAL HISTORY OF OTHER DISEASES OF THE DIGESTIVE SYSTEM: ICD-10-CM

## 2019-12-19 DIAGNOSIS — Z93.3 COLOSTOMY STATUS: ICD-10-CM

## 2019-12-23 ENCOUNTER — APPOINTMENT (OUTPATIENT)
Dept: SURGERY | Facility: CLINIC | Age: 59
End: 2019-12-23
Payer: COMMERCIAL

## 2020-01-08 ENCOUNTER — APPOINTMENT (OUTPATIENT)
Dept: SURGERY | Facility: CLINIC | Age: 60
End: 2020-01-08
Payer: COMMERCIAL

## 2020-01-08 VITALS
SYSTOLIC BLOOD PRESSURE: 147 MMHG | OXYGEN SATURATION: 96 % | RESPIRATION RATE: 16 BRPM | TEMPERATURE: 98.9 F | DIASTOLIC BLOOD PRESSURE: 91 MMHG | HEART RATE: 76 BPM

## 2020-01-08 PROCEDURE — 99024 POSTOP FOLLOW-UP VISIT: CPT

## 2020-01-08 RX ORDER — TRIAMTERENE AND HYDROCHLOROTHIAZIDE 37.5; 25 MG/1; MG/1
37.5-25 CAPSULE ORAL
Refills: 0 | Status: ACTIVE | COMMUNITY

## 2020-01-08 RX ORDER — TRIAMTERENE AND HYDROCHLOROTHIAZIDE 25; 37.5 MG/1; MG/1
37.5-25 TABLET ORAL
Qty: 90 | Refills: 0 | Status: DISCONTINUED | COMMUNITY
Start: 2019-03-25 | End: 2020-01-08

## 2020-01-08 RX ORDER — CYCLOBENZAPRINE HYDROCHLORIDE 5 MG/1
5 TABLET, FILM COATED ORAL
Qty: 90 | Refills: 0 | Status: DISCONTINUED | COMMUNITY
Start: 2019-07-23 | End: 2020-01-08

## 2020-01-08 RX ORDER — OXYCODONE 5 MG/1
5 TABLET ORAL
Refills: 0 | Status: DISCONTINUED | COMMUNITY
End: 2020-01-08

## 2020-01-08 NOTE — PHYSICAL EXAM
[Abdomen Masses] : No abdominal masses [Abdomen Tenderness] : ~T No ~M abdominal tenderness [de-identified] : healed wound, normal ostomy

## 2020-01-08 NOTE — HISTORY OF PRESENT ILLNESS
[FreeTextEntry1] : Marcie is a 59 year old female with a history of Carolyn procedure for perforated diverticulitis. She is status post closure of Carolyn procedure, appendectomy, suture of seromuscular tear to small bowel, diverting loop ileostomy, and repair of parastomal hernia on 12/10/19. Patient was last seen 12/18 for removal of staples. \par Patient reports feeling well. Incisional discomfort when coughing. Normal output from ostomy. \par One episode of rectal discharge yesterday, full control. \par

## 2020-01-08 NOTE — ASSESSMENT
[FreeTextEntry1] : Doing well. Triple contrast CT scan in 2 weeks. If unremarkable then ileostomy closure 2 weeks after that. Indications, risks, benefits, alternatives reviewed including but not limited to bleeding, infection, and anastomotic leak. All questions answered

## 2020-01-20 ENCOUNTER — FORM ENCOUNTER (OUTPATIENT)
Age: 60
End: 2020-01-20

## 2020-01-21 ENCOUNTER — APPOINTMENT (OUTPATIENT)
Dept: CT IMAGING | Facility: IMAGING CENTER | Age: 60
End: 2020-01-21
Payer: COMMERCIAL

## 2020-01-21 ENCOUNTER — OUTPATIENT (OUTPATIENT)
Dept: OUTPATIENT SERVICES | Facility: HOSPITAL | Age: 60
LOS: 1 days | End: 2020-01-21
Payer: COMMERCIAL

## 2020-01-21 DIAGNOSIS — Z93.3 COLOSTOMY STATUS: Chronic | ICD-10-CM

## 2020-01-21 DIAGNOSIS — Z98.1 ARTHRODESIS STATUS: Chronic | ICD-10-CM

## 2020-01-21 DIAGNOSIS — K57.30 DIVERTICULOSIS OF LARGE INTESTINE WITHOUT PERFORATION OR ABSCESS WITHOUT BLEEDING: ICD-10-CM

## 2020-01-21 PROCEDURE — 74177 CT ABD & PELVIS W/CONTRAST: CPT

## 2020-01-21 PROCEDURE — 74177 CT ABD & PELVIS W/CONTRAST: CPT | Mod: 26

## 2020-01-22 DIAGNOSIS — K43.5 PARASTOMAL HERNIA WITHOUT OBSTRUCTION OR GANGRENE: ICD-10-CM

## 2020-01-29 ENCOUNTER — APPOINTMENT (OUTPATIENT)
Dept: SURGERY | Facility: CLINIC | Age: 60
End: 2020-01-29
Payer: COMMERCIAL

## 2020-01-29 VITALS
SYSTOLIC BLOOD PRESSURE: 167 MMHG | TEMPERATURE: 98.1 F | OXYGEN SATURATION: 99 % | HEART RATE: 65 BPM | DIASTOLIC BLOOD PRESSURE: 91 MMHG | RESPIRATION RATE: 16 BRPM

## 2020-01-29 DIAGNOSIS — K56.699 OTHER INTESTINAL OBSTRUCTION UNSPECIFIED AS TO PARTIAL VERSUS COMPLETE OBSTRUCTION: ICD-10-CM

## 2020-01-29 PROCEDURE — 45331 SIGMOIDOSCOPY AND BIOPSY: CPT | Mod: 58,52

## 2020-01-29 PROCEDURE — 99024 POSTOP FOLLOW-UP VISIT: CPT

## 2020-01-29 NOTE — ASSESSMENT
[FreeTextEntry1] : Since the lumen of the anastomosis is narrowed to 5 mm I recommended balloon dilation. Indications, risks, benefits, alternatives reviewed including but not limited to bleeding, perforation, and recurrence. I suggested that this occur approximately 7-14 days prior to ileostomy closure and the patient stated understanding. All questions were answered.

## 2020-01-29 NOTE — HISTORY OF PRESENT ILLNESS
[FreeTextEntry1] : Marcie is a 59 year old female with a history of Carolyn procedure for perforated diverticulitis. She is status post closure of Carolyn procedure, appendectomy, suture of seromuscular tear to small bowel, diverting loop ileostomy, and repair of parastomal hernia on 12/10/19.  She is here today for a flex sig.

## 2020-01-29 NOTE — PROCEDURE
[FreeTextEntry1] : Perianal inspection and digital exam unremarkable. Flexible sigmoidoscopy revealed a 5 mm anastomosis. Cold biopsy forceps used to try to cut the scar a little too opened further. This was unsuccessful.

## 2020-02-03 ENCOUNTER — APPOINTMENT (OUTPATIENT)
Dept: SURGERY | Facility: HOSPITAL | Age: 60
End: 2020-02-03
Payer: COMMERCIAL

## 2020-02-03 ENCOUNTER — APPOINTMENT (OUTPATIENT)
Dept: SURGERY | Facility: HOSPITAL | Age: 60
End: 2020-02-03

## 2020-02-03 ENCOUNTER — OUTPATIENT (OUTPATIENT)
Dept: OUTPATIENT SERVICES | Facility: HOSPITAL | Age: 60
LOS: 1 days | Discharge: HOME | End: 2020-02-03
Payer: COMMERCIAL

## 2020-02-03 DIAGNOSIS — Z93.3 COLOSTOMY STATUS: Chronic | ICD-10-CM

## 2020-02-03 DIAGNOSIS — K56.699 OTHER INTESTINAL OBSTRUCTION UNSPECIFIED AS TO PARTIAL VERSUS COMPLETE OBSTRUCTION: ICD-10-CM

## 2020-02-03 DIAGNOSIS — Z98.1 ARTHRODESIS STATUS: Chronic | ICD-10-CM

## 2020-02-03 PROCEDURE — 45386 COLONOSCOPY W/BALLOON DILAT: CPT | Mod: 58

## 2020-02-03 PROCEDURE — 45386 COLONOSCOPY W/BALLOON DILAT: CPT

## 2020-02-03 PROCEDURE — C1726: CPT

## 2020-02-04 ENCOUNTER — OUTPATIENT (OUTPATIENT)
Dept: OUTPATIENT SERVICES | Facility: HOSPITAL | Age: 60
LOS: 1 days | End: 2020-02-04
Payer: COMMERCIAL

## 2020-02-04 VITALS
WEIGHT: 126.99 LBS | RESPIRATION RATE: 16 BRPM | HEIGHT: 62.5 IN | DIASTOLIC BLOOD PRESSURE: 85 MMHG | HEART RATE: 72 BPM | SYSTOLIC BLOOD PRESSURE: 131 MMHG | TEMPERATURE: 98 F | OXYGEN SATURATION: 99 %

## 2020-02-04 DIAGNOSIS — K43.5 PARASTOMAL HERNIA WITHOUT OBSTRUCTION OR GANGRENE: ICD-10-CM

## 2020-02-04 DIAGNOSIS — Z93.2 ILEOSTOMY STATUS: ICD-10-CM

## 2020-02-04 DIAGNOSIS — Z01.818 ENCOUNTER FOR OTHER PREPROCEDURAL EXAMINATION: ICD-10-CM

## 2020-02-04 DIAGNOSIS — Z29.9 ENCOUNTER FOR PROPHYLACTIC MEASURES, UNSPECIFIED: ICD-10-CM

## 2020-02-04 DIAGNOSIS — K57.30 DIVERTICULOSIS OF LARGE INTESTINE WITHOUT PERFORATION OR ABSCESS WITHOUT BLEEDING: ICD-10-CM

## 2020-02-04 DIAGNOSIS — Z98.890 OTHER SPECIFIED POSTPROCEDURAL STATES: Chronic | ICD-10-CM

## 2020-02-04 DIAGNOSIS — Z98.1 ARTHRODESIS STATUS: Chronic | ICD-10-CM

## 2020-02-04 DIAGNOSIS — Z93.3 COLOSTOMY STATUS: Chronic | ICD-10-CM

## 2020-02-04 LAB
ANION GAP SERPL CALC-SCNC: 15 MMOL/L — SIGNIFICANT CHANGE UP (ref 5–17)
BLD GP AB SCN SERPL QL: NEGATIVE — SIGNIFICANT CHANGE UP
BUN SERPL-MCNC: 44 MG/DL — HIGH (ref 7–23)
CALCIUM SERPL-MCNC: 9.7 MG/DL — SIGNIFICANT CHANGE UP (ref 8.4–10.5)
CHLORIDE SERPL-SCNC: 99 MMOL/L — SIGNIFICANT CHANGE UP (ref 96–108)
CO2 SERPL-SCNC: 22 MMOL/L — SIGNIFICANT CHANGE UP (ref 22–31)
CREAT SERPL-MCNC: 1.04 MG/DL — SIGNIFICANT CHANGE UP (ref 0.5–1.3)
GLUCOSE SERPL-MCNC: 102 MG/DL — HIGH (ref 70–99)
HBA1C BLD-MCNC: 5.8 % — HIGH (ref 4–5.6)
HCT VFR BLD CALC: 42.9 % — SIGNIFICANT CHANGE UP (ref 34.5–45)
HGB BLD-MCNC: 13.3 G/DL — SIGNIFICANT CHANGE UP (ref 11.5–15.5)
MCHC RBC-ENTMCNC: 26.6 PG — LOW (ref 27–34)
MCHC RBC-ENTMCNC: 31 GM/DL — LOW (ref 32–36)
MCV RBC AUTO: 85.8 FL — SIGNIFICANT CHANGE UP (ref 80–100)
NRBC # BLD: 0 /100 WBCS — SIGNIFICANT CHANGE UP (ref 0–0)
PLATELET # BLD AUTO: 327 K/UL — SIGNIFICANT CHANGE UP (ref 150–400)
POTASSIUM SERPL-MCNC: 3.3 MMOL/L — LOW (ref 3.5–5.3)
POTASSIUM SERPL-SCNC: 3.3 MMOL/L — LOW (ref 3.5–5.3)
RBC # BLD: 5 M/UL — SIGNIFICANT CHANGE UP (ref 3.8–5.2)
RBC # FLD: 14.5 % — SIGNIFICANT CHANGE UP (ref 10.3–14.5)
RH IG SCN BLD-IMP: NEGATIVE — SIGNIFICANT CHANGE UP
SODIUM SERPL-SCNC: 136 MMOL/L — SIGNIFICANT CHANGE UP (ref 135–145)
WBC # BLD: 11.58 K/UL — HIGH (ref 3.8–10.5)
WBC # FLD AUTO: 11.58 K/UL — HIGH (ref 3.8–10.5)

## 2020-02-04 PROCEDURE — 86901 BLOOD TYPING SEROLOGIC RH(D): CPT

## 2020-02-04 PROCEDURE — 85027 COMPLETE CBC AUTOMATED: CPT

## 2020-02-04 PROCEDURE — G0463: CPT

## 2020-02-04 PROCEDURE — 83036 HEMOGLOBIN GLYCOSYLATED A1C: CPT

## 2020-02-04 PROCEDURE — 80048 BASIC METABOLIC PNL TOTAL CA: CPT

## 2020-02-04 PROCEDURE — 86850 RBC ANTIBODY SCREEN: CPT

## 2020-02-04 PROCEDURE — 86900 BLOOD TYPING SEROLOGIC ABO: CPT

## 2020-02-04 RX ORDER — CHLORHEXIDINE GLUCONATE 213 G/1000ML
1 SOLUTION TOPICAL ONCE
Refills: 0 | Status: DISCONTINUED | OUTPATIENT
Start: 2020-02-14 | End: 2020-02-14

## 2020-02-04 RX ORDER — LIDOCAINE HCL 20 MG/ML
0.2 VIAL (ML) INJECTION ONCE
Refills: 0 | Status: DISCONTINUED | OUTPATIENT
Start: 2020-02-14 | End: 2020-02-14

## 2020-02-04 RX ORDER — CEFOTETAN DISODIUM 1 G
2 VIAL (EA) INJECTION ONCE
Refills: 0 | Status: DISCONTINUED | OUTPATIENT
Start: 2020-02-14 | End: 2020-02-14

## 2020-02-04 RX ORDER — SODIUM CHLORIDE 9 MG/ML
3 INJECTION INTRAMUSCULAR; INTRAVENOUS; SUBCUTANEOUS EVERY 8 HOURS
Refills: 0 | Status: DISCONTINUED | OUTPATIENT
Start: 2020-02-14 | End: 2020-02-14

## 2020-02-04 RX ORDER — LOPERAMIDE HCL 2 MG
2 TABLET ORAL
Qty: 0 | Refills: 0 | DISCHARGE

## 2020-02-04 RX ORDER — ALPRAZOLAM 0.25 MG
0.5 TABLET ORAL
Qty: 0 | Refills: 0 | DISCHARGE

## 2020-02-04 NOTE — H&P PST ADULT - NSICDXPROBLEM_GEN_ALL_CORE_FT
PROBLEM DIAGNOSES  Problem: Ileostomy status  Assessment and Plan: scheduled for closure of loop ileostomy parastomal hernia repair on 2/14/2020 with Dr. Navarro.    Problem: Pre-op evaluation  Assessment and Plan: Labs - CBC, BMP, T&S and A1c  MC with PCP. Pre op and Hibiclens instructions reviewed and given. ERAS as per surgeon. Take routine am meds DOS with sip of water. Instructed to avoid NSAIDs and OTC supplements. Verbalized understanding     Problem: Need for prophylactic measure  Assessment and Plan: The Caprini score indicates this patient is at risk for a VTE event (score 3-5).  Most surgical patients in this group would benefit from pharmacologic prophylaxis.  The surgical team will determine the balance between VTE risk and bleeding risk

## 2020-02-04 NOTE — H&P PST ADULT - NSICDXPASTSURGICALHX_GEN_ALL_CORE_FT
PAST SURGICAL HISTORY:  Congenital Anomaly of Kidney left kidney, removed at 5 year old    H/O major abdominal surgery 12/10/2019    History of lumbar fusion 2004    S/P  Section 3 c-sections    S/P Cholecystectomy 20 years ago    Status post Carolyn procedure 19 Colostomy left lower quadrant, Taken back to OR for adhesiolysis 19 PAST SURGICAL HISTORY:  Congenital Anomaly of Kidney left kidney, removed at 5 year old    H/O major abdominal surgery Appendectomy, closures of colostomy and creation of ileostomy 12/10/2019    History of lumbar fusion     S/P  Section 3 c-sections    S/P Cholecystectomy 20 years ago    Status post Carolyn procedure 19 Colostomy left lower quadrant, Taken back to OR for adhesiolysis 19

## 2020-02-04 NOTE — H&P PST ADULT - HISTORY OF PRESENT ILLNESS
60 yo female with HTN, presents to [facility] scheduled for closure of loop ileostomy parastomal hernia repair on 2/14/2020 with Dr. Navarro. H/O colostomy secondary to a ruptured a diverticulitis from 6/1/2019. Was previously scheduled for 12/10/19 for closure, went to the OR, was found to be inflamed and subsequently ended up having an appendectomy, closure of the colostomy but creation of an ileostomy. 60 yo female with HTN, [facility] scheduled for closure of loop ileostomy parastomal hernia repair on 2/14/2020 with Dr. Navarro. H/O colostomy secondary to a ruptured a diverticulitis from 6/1/2019. Was previously scheduled for 12/10/19 for closure, went to the OR, was found to be inflamed and subsequently ended up having an appendectomy, closure of the colostomy but creation of an ileostomy.  Now scheduled for reversal of ileostomy. Denies abd pain and N/V

## 2020-02-04 NOTE — H&P PST ADULT - NSICDXPASTMEDICALHX_GEN_ALL_CORE_FT
PAST MEDICAL HISTORY:  Colon obstruction     Congenital vesico-uretero-renal reflux     Diverticulitis perforated    Diverticulosis of intestine without perforation or abscess     History of Gestational Diabetes     History of Goiter 20 years ago, was on synthroid, resolved self    History of Pneumonia     HTN (Hypertension)     Ileostomy status     Lumbago     Lumbar disc disease     Lumbar Disc Disease     OA (osteoarthritis)     Parastomal hernia

## 2020-02-04 NOTE — H&P PST ADULT - ASSESSMENT
" ICU Inborn Progress Notes      Age: 12 days Follow Up Provider:     Sex: female Admit Attending: Afshin Gallagher MD   CLEMENCIA:  Gestational Age: 33w5d BW: 2082 g (4 lb 9.4 oz)   Corrected Gest. Age:  35w 3d    Subjective   Overview:      Vaginal Delivery for prematurity at 33w 5d gestation, vacuum x 4 applied. Maternal history and prenatal labs reviewed.  PPROM x ~25 hrs. Amniotic fluid was Clear. Mag initiated evening of  and turned off around 1500 on . Delayed Cord Clampin seconds. Infant vigorous at birth with strong cry.    Interval History:    Discussed with bedside nurse patient's course overnight. Nursing notes reviewed.    Remains on room air; history of emesis with desaturations noted by RN--not documented. Remains on full enteral feeds on pump over 1 hr, working on PO feeds. Remains inincubator.      Objective   Medications:     Scheduled Meds:    caffeine citrate 10 mg/kg Oral Daily   pediatric multivitamin-iron 0.5 mL Oral Q12H   sodium chloride 3 mL Intravenous Q12H     Continuous Infusions:      PRN Meds:   sodium chloride  •  sucrose  •  zinc oxide    Devices, Monitoring, Treatments:     Lines, Devices, Monitoring and Treatments:    NG since     S/P UAC -1/3  S/P PIV  -      Necessity of devices was discussed with the treatment team and continued or discontinued as appropriate: yes    Respiratory Support:     Room air     Physical Exam:        Current: Weight: (!) 2167 g (4 lb 12.4 oz)(x2) Birth Weight Change: 4%   Last HC: 30.5 cm (12.01\")      PainScore:        Apnea and Bradycardia:     Bradycardia rate: No Data Recorded    Temp:  [98.1 °F (36.7 °C)-99.1 °F (37.3 °C)] 98.4 °F (36.9 °C)  Heart Rate:  [141-176] 160  Resp:  [36-60] 50  BP: (82-86)/(40-64) 82/50  SpO2 Current: SpO2  Min: 95 %  Max: 100 %    Heent: fontanelles are soft and flat, Significant residual molding/caput. Facial asymmetry improved   Respiratory: clear breath sounds bilaterally, no retractions " "or nasal flaring. Good air entry heard.    Cardiovascular: RRR, S1 S2, no murmurs, 2+ brachial and femoral pulses, brisk capillary refill   Abdomen: Soft, non tender,round, non-distended, good bowel sounds, no loops, cord drying   : normal external  female genitalia   Extremities: well-perfused, warm and dry, CAMPA well, normal digitation    Skin: no rashes, or bruising, pink, mild mottling, intact    Neuro: easily aroused, active, alert, normal tone and cry      Radiology and Labs:      I have reviewed all the lab results for the past 24 hours. Pertinent findings reviewed in assessment and plan.  yes    I have reviewed all the imaging results for the past 24 hours. Pertinent findings reviewed in assessment and plan. yes    Intake and Output:      Current Weight: Weight: (!) 2167 g (4 lb 12.4 oz)(x2) Last 24hr Weight change: -38 g (-1.3 oz)   Growth:    7 day weight gain: N/A  (to be calculated on  and u)     Intake:     Total Fluid Goal: 160 ml/kg/day Total Fluid Actual: 161 ml/kg/day BF x 1 attempts   Feeds: Maternal BM and Formula  SSC 24   Fortified: No   Route:NG/OG/PO (PO 49 ml total)  BF x 1 attempts     PIV: none Blood Products: none   Output:     UOP: x 8  Emesis: x 4   Stool: x 2      Other: None         Assessment/Plan   Assessment and Plan:      Active Problems:  Prematurity, 2,000-2,499 grams, 33-34 completed weeks  Low birth weight or  infant, 1025-3899 grams  Single liveborn, born in hospital, delivered by vaginal delivery  Assessment: \"Syeda\" is a 33 5/7 wk female infant born via vaginal delivery for PPROM, PTL. Mother is a 32 yr old . Maternal serology: MBT O-, RPR NR, rubella immune, Hep B neg, HIV neg, Hep C neg. Mother received BMZ x 2 on  and . Vigorous with cry at birth. Delayed cord clamping x 30 seconds. BW 2082 grams. BBT O+ ANA LAURA negative.   Plan:  - Age appropriate care   - Routine NICU screening    - CBC prn (last on 1/10)    Apnea of " prematurity  Assessment: Admitted to NICU in room air. Nasal cannula placed on  for apneic/desaturation events. Events improved but continued despite flow, therefore loaded with Caffeine 1/3. Caffeine (1/3-present).Taken back to RA on . Desats to low 80s verbally reported AM 1/10 per RN but not documented (last documented on ).   Plan:  - Discontinue caffeine  - monitor for events off caffeine (since )    [place] delivered by vacuum extraction  Caput succedaneum  Assessment: Required vacuum x 4 at delivery, infant presented with face up. Large caput with molding and fluidity at delivery, possible facial palsy with left side drooping-now resolved and no facial asymmetry appreciated since 1/3. CBC reassuring x 2. Plt () 166k and () 183k. HUS (): Normal. OT consulted   Plan:   - Follow OT recommendations for  for cranial molding   - Consider repeat HUS if desats continue     Temperature regulation disturbance,   Assessment: Admitted in Giraffe incubator and remains in incubator. Transitioned to open crib 1/10.  Plan:   - monitor temps in open crib     Slow feeding in   Assessment: NPO on admission. Mother plans to breastfeed. Mother received Mag PTD. Mg level (): 3.7, (): 3.1, (): 2.7.  UAC -1/3. UAC discontinued overnight 1/3 due to dampened waveform. Infant now tolerating full enteral feeds of MBM/SSC 24 42 ml on pump over 60 min (no formula since ). Infant with weight gain and above BW. Emesis x 2 reported overnight -1/10--abdomen soft and non-distended. Overnight baby given fortified MBM--projectile emesis noted--emesis improved once fortifier taken back out of feeds. Feeds lengthened out to over 90 minutes overnight. Baby only receiving MBM at this time.  Plan:   - Continue feeds of MBM + Neosure 2x/day--at 44 mL q3h on pump over 90 min (may require 90 min if emesis continues)  - Continue to monitor weight (all MBM since )  - Continue to work on  PO as tolerated with cues, infant may attempt to breast feed 1-2x day  - Continue total fluid goal 160 mL/kg/day  - POC glucoses per protocol   - Neochem profile prn  - Continue PVS w/fe 0.5 ml BID     Healthcare Maintenance  [place] screen (): normal   Hepatitis B vaccine given   Vitamin K and Erythromycin in DR   Hearing screen  CCHD- passed   Car seat test  Free T4/TSH not needed (NBS normal for [facility])  PCP      Resolved Problems  Hypoglycemia, --resolved  Assessment: Admission POC glucose 27. Required D10 bolus x 2 and then Subsequent glucose within range.     Need for observation and evaluation of  for sepsis  Prolonged premature rupture of membranes  Assessment: PPROM approx x25 hrs, clear fluid. Mother on antibiotics. GBS unknown. Blood culture (): FNG. S/P Amp/Gent (-) CBC reassuring x3.    Hypocalcemia- - Resolved  Ca Levels - (): 7.2, (): 7.7, (): 8.6, (1/3): 9.6 ()10.5 () 9.8.      Hyperbilirubinemia--resolved   Assessment:  MBT:  O neg, BBT: O pos, ANA LAURA neg. Peak bili () 12.1.  Bili () 7.7 decreased off phototherapy . Phototherapy -.       Discharge Planning:      [place] Testing  CCHD Critical Congen Heart Defect Test Date: 19 (19)  Critical Congen Heart Defect Test Result: pass (19)   Car Seat Challenge Test     Hearing Screen      [place] Screen Metabolic Screen Results: Completed (19)     Immunization History   Administered Date(s) Administered   • Hep B, Adolescent or Pediatric 2019       Expected Discharge Date: TBD    Social comments: None at this time  Family Communication: Mother and Father updated at bedside      Rosa Coffman, APRN  2019  1:08 PM    Patient rounds conducted with Primary Care Nurse   60 yo female with a an ileostomy scheduled for closure of loop ileostomy parastomal hernia repair on 2020 with Dr. Navarro.    DKI SCORE [CLOT]    AGE RELATED RISK FACTORS                                                       MOBILITY RELATED FACTORS  [x ] Age 41-60 years                                            (1 Point)                  [ ] Bed rest                                                        (1 Point)  [ ] Age: 61-74 years                                           (2 Points)                 [ ] Plaster cast                                                   (2 Points)  [ ] Age= 75 years                                              (3 Points)                 [ ] Bed bound for more than 72 hours                 (2 Points)    DISEASE RELATED RISK FACTORS                                               GENDER SPECIFIC FACTORS  [ ] Edema in the lower extremities                       (1 Point)                  [ ] Pregnancy                                                     (1 Point)  [ ] Varicose veins                                               (1 Point)                  [ ] Post-partum < 6 weeks                                   (1 Point)             [x ] BMI > 25 Kg/m2                                            (1 Point)                  [ ] Hormonal therapy  or oral contraception          (1 Point)                 [ ] Sepsis (in the previous month)                        (1 Point)                  [ ] History of pregnancy complications                 (1 point)  [ ] Pneumonia or serious lung disease                                               [ ] Unexplained or recurrent                     (1 Point)           (in the previous month)                               (1 Point)  [ ] Abnormal pulmonary function test                     (1 Point)                 SURGERY RELATED RISK FACTORS  [ ] Acute myocardial infarction                              (1 Point)                 [ ]  Section                                             (1 Point)  [ ] Congestive heart failure (in the previous month)  (1 Point)               [ ] Minor surgery                                                  (1 Point)   [x ] Inflammatory bowel disease                             (1 Point)                 [ ] Arthroscopic surgery                                        (2 Points)  [ ] Central venous access                                      (2 Points)                [ x] General surgery lasting more than 45 minutes   (2 Points)       [ ] Stroke (in the previous month)                          (5 Points)               [ ] Elective arthroplasty                                         (5 Points)                                                                                                                                               HEMATOLOGY RELATED FACTORS                                                 TRAUMA RELATED RISK FACTORS  [ ] Prior episodes of VTE                                     (3 Points)                [ ] Fracture of the hip, pelvis, or leg                       (5 Points)  [ ] Positive family history for VTE                         (3 Points)                 [ ] Acute spinal cord injury (in the previous month)  (5 Points)  [ ] Prothrombin 93054 A                                     (3 Points)                 [ ] Paralysis  (less than 1 month)                             (5 Points)  [ ] Factor V Leiden                                             (3 Points)                  [ ] Multiple Trauma within 1 month                        (5 Points)  [ ] Lupus anticoagulants                                     (3 Points)                                                           [ ] Anticardiolipin antibodies                               (3 Points)                                                       [ ] High homocysteine in the blood                      (3 Points)                                             [ ] Other congenital or acquired thrombophilia      (3 Points)                                                [ ] Heparin induced thrombocytopenia                  (3 Points)                                          Total Score [    5      ]    Dki Score 0 - 2:  Low Risk, No VTE Prophylaxis required for most patients, encourage ambulation  Caprini Score 3 - 6:  At Risk, pharmacologic VTE prophylaxis is indicated for most patients (in the absence of a contraindication)  Caprini Score Greater than or = 7:  High Risk, pharmacologic VTE prophylaxis is indicated for most patients (in the absence of a contraindication) 58 yo female with a an ileostomy scheduled for closure of loop ileostomy parastomal hernia repair on 2020 with Dr. Navarro.    DKI SCORE [CLOT]    AGE RELATED RISK FACTORS                                                       MOBILITY RELATED FACTORS  [x ] Age 41-60 years                                            (1 Point)                  [ ] Bed rest                                                        (1 Point)  [ ] Age: 61-74 years                                           (2 Points)                 [ ] Plaster cast                                                   (2 Points)  [ ] Age= 75 years                                              (3 Points)                 [ ] Bed bound for more than 72 hours                 (2 Points)    DISEASE RELATED RISK FACTORS                                               GENDER SPECIFIC FACTORS  [ ] Edema in the lower extremities                       (1 Point)                  [ ] Pregnancy                                                     (1 Point)  [ ] Varicose veins                                               (1 Point)                  [ ] Post-partum < 6 weeks                                   (1 Point)             [ ] BMI > 25 Kg/m2                                            (1 Point)                  [ ] Hormonal therapy  or oral contraception          (1 Point)                 [ ] Sepsis (in the previous month)                        (1 Point)                  [ ] History of pregnancy complications                 (1 point)  [ ] Pneumonia or serious lung disease                                               [ ] Unexplained or recurrent                     (1 Point)           (in the previous month)                               (1 Point)  [ ] Abnormal pulmonary function test                     (1 Point)                 SURGERY RELATED RISK FACTORS  [ ] Acute myocardial infarction                              (1 Point)                 [ ]  Section                                             (1 Point)  [ ] Congestive heart failure (in the previous month)  (1 Point)               [ ] Minor surgery                                                  (1 Point)   [x ] Inflammatory bowel disease                             (1 Point)                 [ ] Arthroscopic surgery                                        (2 Points)  [ ] Central venous access                                      (2 Points)                [ x] General surgery lasting more than 45 minutes   (2 Points)       [ ] Stroke (in the previous month)                          (5 Points)               [ ] Elective arthroplasty                                         (5 Points)                                                                                                                                               HEMATOLOGY RELATED FACTORS                                                 TRAUMA RELATED RISK FACTORS  [ ] Prior episodes of VTE                                     (3 Points)                [ ] Fracture of the hip, pelvis, or leg                       (5 Points)  [ ] Positive family history for VTE                         (3 Points)                 [ ] Acute spinal cord injury (in the previous month)  (5 Points)  [ ] Prothrombin 87231 A                                     (3 Points)                 [ ] Paralysis  (less than 1 month)                             (5 Points)  [ ] Factor V Leiden                                             (3 Points)                  [ ] Multiple Trauma within 1 month                        (5 Points)  [ ] Lupus anticoagulants                                     (3 Points)                                                           [ ] Anticardiolipin antibodies                               (3 Points)                                                       [ ] High homocysteine in the blood                      (3 Points)                                             [ ] Other congenital or acquired thrombophilia      (3 Points)                                                [ ] Heparin induced thrombocytopenia                  (3 Points)                                          Total Score [   4    ]    Caprini Score 0 - 2:  Low Risk, No VTE Prophylaxis required for most patients, encourage ambulation  Caprini Score 3 - 6:  At Risk, pharmacologic VTE prophylaxis is indicated for most patients (in the absence of a contraindication)  Caprini Score Greater than or = 7:  High Risk, pharmacologic VTE prophylaxis is indicated for most patients (in the absence of a contraindication)

## 2020-02-13 ENCOUNTER — TRANSCRIPTION ENCOUNTER (OUTPATIENT)
Age: 60
End: 2020-02-13

## 2020-02-13 VITALS — WEIGHT: 126.99 LBS | HEIGHT: 62.5 IN

## 2020-02-13 NOTE — PRE-ANESTHESIA EVALUATION ADULT - NSANTHPMHFT_GEN_ALL_CORE
60 y/o F hx of ruptured diverticulitis s/p colostomy 6/1/19 with RTOR 6/19/19 for lysis of adhesions, scheduled for closure 12/10/19, found to have inflammation, received appendectomy with closure of colostomy but ileostomy creation as well, now presents from home for elective closure of ileostomy.

## 2020-02-14 ENCOUNTER — INPATIENT (INPATIENT)
Facility: HOSPITAL | Age: 60
LOS: 1 days | Discharge: HOME | DRG: 331 | End: 2020-02-16
Payer: COMMERCIAL

## 2020-02-14 ENCOUNTER — APPOINTMENT (OUTPATIENT)
Dept: SURGERY | Facility: HOSPITAL | Age: 60
End: 2020-02-14
Payer: COMMERCIAL

## 2020-02-14 ENCOUNTER — RESULT REVIEW (OUTPATIENT)
Age: 60
End: 2020-02-14

## 2020-02-14 VITALS
SYSTOLIC BLOOD PRESSURE: 147 MMHG | DIASTOLIC BLOOD PRESSURE: 88 MMHG | HEART RATE: 72 BPM | RESPIRATION RATE: 16 BRPM | OXYGEN SATURATION: 97 % | TEMPERATURE: 98 F | WEIGHT: 126.99 LBS | HEIGHT: 62.5 IN

## 2020-02-14 DIAGNOSIS — Z93.3 COLOSTOMY STATUS: Chronic | ICD-10-CM

## 2020-02-14 DIAGNOSIS — Z93.2 ILEOSTOMY STATUS: ICD-10-CM

## 2020-02-14 DIAGNOSIS — Z98.1 ARTHRODESIS STATUS: Chronic | ICD-10-CM

## 2020-02-14 DIAGNOSIS — K57.30 DIVERTICULOSIS OF LARGE INTESTINE WITHOUT PERFORATION OR ABSCESS WITHOUT BLEEDING: ICD-10-CM

## 2020-02-14 DIAGNOSIS — Z98.890 OTHER SPECIFIED POSTPROCEDURAL STATES: Chronic | ICD-10-CM

## 2020-02-14 LAB — GLUCOSE BLDC GLUCOMTR-MCNC: 142 MG/DL — HIGH (ref 70–99)

## 2020-02-14 PROCEDURE — 49568: CPT

## 2020-02-14 PROCEDURE — 49560: CPT | Mod: 59,79

## 2020-02-14 PROCEDURE — 88304 TISSUE EXAM BY PATHOLOGIST: CPT | Mod: 26

## 2020-02-14 PROCEDURE — 44625 REPAIR BOWEL OPENING: CPT | Mod: 58

## 2020-02-14 RX ORDER — CELECOXIB 200 MG/1
400 CAPSULE ORAL ONCE
Refills: 0 | Status: DISCONTINUED | OUTPATIENT
Start: 2020-02-14 | End: 2020-02-14

## 2020-02-14 RX ORDER — GABAPENTIN 400 MG/1
600 CAPSULE ORAL ONCE
Refills: 0 | Status: COMPLETED | OUTPATIENT
Start: 2020-02-14 | End: 2020-02-14

## 2020-02-14 RX ORDER — AMLODIPINE BESYLATE 2.5 MG/1
5 TABLET ORAL DAILY
Refills: 0 | Status: DISCONTINUED | OUTPATIENT
Start: 2020-02-14 | End: 2020-02-16

## 2020-02-14 RX ORDER — OXYCODONE HYDROCHLORIDE 5 MG/1
5 TABLET ORAL EVERY 4 HOURS
Refills: 0 | Status: DISCONTINUED | OUTPATIENT
Start: 2020-02-14 | End: 2020-02-16

## 2020-02-14 RX ORDER — ACETAMINOPHEN 500 MG
1000 TABLET ORAL EVERY 6 HOURS
Refills: 0 | Status: DISCONTINUED | OUTPATIENT
Start: 2020-02-14 | End: 2020-02-15

## 2020-02-14 RX ORDER — ONDANSETRON 8 MG/1
4 TABLET, FILM COATED ORAL ONCE
Refills: 0 | Status: DISCONTINUED | OUTPATIENT
Start: 2020-02-14 | End: 2020-02-14

## 2020-02-14 RX ORDER — CYCLOBENZAPRINE HYDROCHLORIDE 10 MG/1
1 TABLET, FILM COATED ORAL
Qty: 0 | Refills: 0 | DISCHARGE

## 2020-02-14 RX ORDER — TRIAMTERENE/HYDROCHLOROTHIAZID 75 MG-50MG
1 TABLET ORAL
Qty: 0 | Refills: 0 | DISCHARGE

## 2020-02-14 RX ORDER — AMLODIPINE BESYLATE 2.5 MG/1
1 TABLET ORAL
Qty: 0 | Refills: 0 | DISCHARGE

## 2020-02-14 RX ORDER — SODIUM CHLORIDE 9 MG/ML
1000 INJECTION, SOLUTION INTRAVENOUS
Refills: 0 | Status: DISCONTINUED | OUTPATIENT
Start: 2020-02-14 | End: 2020-02-15

## 2020-02-14 RX ORDER — FENOFIBRATE,MICRONIZED 130 MG
48 CAPSULE ORAL DAILY
Refills: 0 | Status: DISCONTINUED | OUTPATIENT
Start: 2020-02-14 | End: 2020-02-16

## 2020-02-14 RX ORDER — FENOFIBRATE,MICRONIZED 130 MG
1 CAPSULE ORAL
Qty: 0 | Refills: 0 | DISCHARGE

## 2020-02-14 RX ORDER — TRIAMTERENE/HYDROCHLOROTHIAZID 75 MG-50MG
1 TABLET ORAL DAILY
Refills: 0 | Status: DISCONTINUED | OUTPATIENT
Start: 2020-02-14 | End: 2020-02-16

## 2020-02-14 RX ORDER — HYDROMORPHONE HYDROCHLORIDE 2 MG/ML
0.25 INJECTION INTRAMUSCULAR; INTRAVENOUS; SUBCUTANEOUS ONCE
Refills: 0 | Status: DISCONTINUED | OUTPATIENT
Start: 2020-02-14 | End: 2020-02-14

## 2020-02-14 RX ORDER — LANOLIN ALCOHOL/MO/W.PET/CERES
1 CREAM (GRAM) TOPICAL
Qty: 0 | Refills: 0 | DISCHARGE

## 2020-02-14 RX ORDER — ENOXAPARIN SODIUM 100 MG/ML
40 INJECTION SUBCUTANEOUS DAILY
Refills: 0 | Status: DISCONTINUED | OUTPATIENT
Start: 2020-02-14 | End: 2020-02-16

## 2020-02-14 RX ORDER — ALPRAZOLAM 0.25 MG
0.5 TABLET ORAL
Qty: 0 | Refills: 0 | DISCHARGE

## 2020-02-14 RX ADMIN — Medication 400 MILLIGRAM(S): at 23:43

## 2020-02-14 RX ADMIN — GABAPENTIN 600 MILLIGRAM(S): 400 CAPSULE ORAL at 14:16

## 2020-02-14 RX ADMIN — HYDROMORPHONE HYDROCHLORIDE 0.25 MILLIGRAM(S): 2 INJECTION INTRAMUSCULAR; INTRAVENOUS; SUBCUTANEOUS at 18:44

## 2020-02-14 RX ADMIN — SODIUM CHLORIDE 40 MILLILITER(S): 9 INJECTION, SOLUTION INTRAVENOUS at 18:44

## 2020-02-14 RX ADMIN — SODIUM CHLORIDE 40 MILLILITER(S): 9 INJECTION, SOLUTION INTRAVENOUS at 23:43

## 2020-02-14 RX ADMIN — HYDROMORPHONE HYDROCHLORIDE 0.25 MILLIGRAM(S): 2 INJECTION INTRAMUSCULAR; INTRAVENOUS; SUBCUTANEOUS at 18:25

## 2020-02-14 NOTE — BRIEF OPERATIVE NOTE - NSICDXBRIEFPROCEDURE_GEN_ALL_CORE_FT
PROCEDURES:  Creation, revision, or reversal, loop colostomy or ileostomy 14-Feb-2020 18:45:29 reversal of loop ileostomy Rahul Cruz

## 2020-02-14 NOTE — CHART NOTE - NSCHARTNOTEFT_GEN_A_CORE
SURGERY POST-OP NOTE    S/P reversal of loop ileostomy and repair of parastomal hernia with strattice mesh    S: Patient doing well, denies fevers, chills, nausea, emesis, chest pain, SOB. Tolerating CLD. Ambulated OOB to chair in PACU. Pain well controlled.    O: Vital Signs  T(C): 36.3 (02-14 @ 23:25), Max: 36.7 (02-14 @ 12:02)  HR: 65 (02-14 @ 23:25) (59 - 85)  BP: 122/75 (02-14 @ 23:25) (113/85 - 153/74)  RR: 18 (02-14 @ 23:25) (14 - 18)  SpO2: 99% (02-14 @ 23:25) (97% - 100%)  02-14-20 @ 07:01  -  02-14-20 @ 23:57  --------------------------------------------------------  IN: 600 mL / OUT: 350 mL / NET: 250 mL      General: alert and oriented, NAD  Resp: airway patent, respirations unlabored  CVS: regular rate and rhythm  Abdomen: soft, nontender, nondistended, dressing c/d/i  Extremities: no edema  Skin: warm, dry, appropriate color      A/P: 58 yo female PMH HTN, Carolyn's for perforated diverticulitis s/p colostomy reversal, creation of diverting loop ileostomy and appendectomy (12/10/19) now POD0 s/p closure of loop ileostomy and parastomal hernia repair with strattice mesh 2/12/20. Doing well post-operatively.  Plan:  ERAS protocol     --Pain control: Acetaminophen 1000mg q 8hr x 24hrs, Oxycodone 5mg IR moderate pain and 10mg severe pain, 0.2mg IV dilaudid for BT pain only  --MagOxide 1gm BID (POD#1)  --OOB/ambi/IS  --Fluid management: LR 40, change to (30cc/kg/24hr); avoid fluid boluses, d/c after 600cc PO or tolerating solids  --Diet: low residue diet as tolerated  --ondansetron 8mg RTC x24hrs, reglan PRN   --VTE PPx: SCDs, OOB, BLANCA  --d/c maier POD#1, TOV  --D/C home when flatus/BM and tolerating LRD   - Pain control  - F/U AM labs    [facility] [id number]

## 2020-02-15 ENCOUNTER — TRANSCRIPTION ENCOUNTER (OUTPATIENT)
Age: 60
End: 2020-02-15

## 2020-02-15 LAB
ANION GAP SERPL CALC-SCNC: 14 MMOL/L — SIGNIFICANT CHANGE UP (ref 5–17)
BUN SERPL-MCNC: 21 MG/DL — SIGNIFICANT CHANGE UP (ref 7–23)
CALCIUM SERPL-MCNC: 8.6 MG/DL — SIGNIFICANT CHANGE UP (ref 8.4–10.5)
CHLORIDE SERPL-SCNC: 100 MMOL/L — SIGNIFICANT CHANGE UP (ref 96–108)
CO2 SERPL-SCNC: 23 MMOL/L — SIGNIFICANT CHANGE UP (ref 22–31)
CREAT SERPL-MCNC: 0.89 MG/DL — SIGNIFICANT CHANGE UP (ref 0.5–1.3)
GLUCOSE SERPL-MCNC: 113 MG/DL — HIGH (ref 70–99)
HCT VFR BLD CALC: 38.3 % — SIGNIFICANT CHANGE UP (ref 34.5–45)
HGB BLD-MCNC: 12 G/DL — SIGNIFICANT CHANGE UP (ref 11.5–15.5)
MAGNESIUM SERPL-MCNC: 2.2 MG/DL — SIGNIFICANT CHANGE UP (ref 1.6–2.6)
MCHC RBC-ENTMCNC: 27 PG — SIGNIFICANT CHANGE UP (ref 27–34)
MCHC RBC-ENTMCNC: 31.3 GM/DL — LOW (ref 32–36)
MCV RBC AUTO: 86.3 FL — SIGNIFICANT CHANGE UP (ref 80–100)
NRBC # BLD: 0 /100 WBCS — SIGNIFICANT CHANGE UP (ref 0–0)
PHOSPHATE SERPL-MCNC: 3.4 MG/DL — SIGNIFICANT CHANGE UP (ref 2.5–4.5)
PLATELET # BLD AUTO: 227 K/UL — SIGNIFICANT CHANGE UP (ref 150–400)
POTASSIUM SERPL-MCNC: 3.3 MMOL/L — LOW (ref 3.5–5.3)
POTASSIUM SERPL-SCNC: 3.3 MMOL/L — LOW (ref 3.5–5.3)
RBC # BLD: 4.44 M/UL — SIGNIFICANT CHANGE UP (ref 3.8–5.2)
RBC # FLD: 14.6 % — HIGH (ref 10.3–14.5)
SODIUM SERPL-SCNC: 137 MMOL/L — SIGNIFICANT CHANGE UP (ref 135–145)
WBC # BLD: 11.18 K/UL — HIGH (ref 3.8–10.5)
WBC # FLD AUTO: 11.18 K/UL — HIGH (ref 3.8–10.5)

## 2020-02-15 RX ORDER — SODIUM CHLORIDE 9 MG/ML
1000 INJECTION, SOLUTION INTRAVENOUS
Refills: 0 | Status: DISCONTINUED | OUTPATIENT
Start: 2020-02-15 | End: 2020-02-15

## 2020-02-15 RX ORDER — MAGNESIUM OXIDE 400 MG ORAL TABLET 241.3 MG
1000 TABLET ORAL
Refills: 0 | Status: DISCONTINUED | OUTPATIENT
Start: 2020-02-15 | End: 2020-02-15

## 2020-02-15 RX ORDER — LANOLIN ALCOHOL/MO/W.PET/CERES
5 CREAM (GRAM) TOPICAL AT BEDTIME
Refills: 0 | Status: DISCONTINUED | OUTPATIENT
Start: 2020-02-15 | End: 2020-02-16

## 2020-02-15 RX ORDER — POTASSIUM CHLORIDE 20 MEQ
40 PACKET (EA) ORAL ONCE
Refills: 0 | Status: COMPLETED | OUTPATIENT
Start: 2020-02-15 | End: 2020-02-15

## 2020-02-15 RX ORDER — ALPRAZOLAM 0.25 MG
0.5 TABLET ORAL AT BEDTIME
Refills: 0 | Status: DISCONTINUED | OUTPATIENT
Start: 2020-02-15 | End: 2020-02-16

## 2020-02-15 RX ORDER — ACETAMINOPHEN 500 MG
650 TABLET ORAL EVERY 6 HOURS
Refills: 0 | Status: DISCONTINUED | OUTPATIENT
Start: 2020-02-15 | End: 2020-02-16

## 2020-02-15 RX ORDER — MAGNESIUM OXIDE 400 MG ORAL TABLET 241.3 MG
800 TABLET ORAL
Refills: 0 | Status: DISCONTINUED | OUTPATIENT
Start: 2020-02-15 | End: 2020-02-15

## 2020-02-15 RX ADMIN — Medication 650 MILLIGRAM(S): at 18:50

## 2020-02-15 RX ADMIN — AMLODIPINE BESYLATE 5 MILLIGRAM(S): 2.5 TABLET ORAL at 05:14

## 2020-02-15 RX ADMIN — OXYCODONE HYDROCHLORIDE 5 MILLIGRAM(S): 5 TABLET ORAL at 14:35

## 2020-02-15 RX ADMIN — ENOXAPARIN SODIUM 40 MILLIGRAM(S): 100 INJECTION SUBCUTANEOUS at 12:10

## 2020-02-15 RX ADMIN — Medication 400 MILLIGRAM(S): at 12:05

## 2020-02-15 RX ADMIN — OXYCODONE HYDROCHLORIDE 5 MILLIGRAM(S): 5 TABLET ORAL at 00:47

## 2020-02-15 RX ADMIN — OXYCODONE HYDROCHLORIDE 5 MILLIGRAM(S): 5 TABLET ORAL at 14:17

## 2020-02-15 RX ADMIN — OXYCODONE HYDROCHLORIDE 5 MILLIGRAM(S): 5 TABLET ORAL at 07:03

## 2020-02-15 RX ADMIN — OXYCODONE HYDROCHLORIDE 5 MILLIGRAM(S): 5 TABLET ORAL at 06:33

## 2020-02-15 RX ADMIN — OXYCODONE HYDROCHLORIDE 5 MILLIGRAM(S): 5 TABLET ORAL at 00:17

## 2020-02-15 RX ADMIN — Medication 400 MILLIGRAM(S): at 05:14

## 2020-02-15 RX ADMIN — Medication 40 MILLIEQUIVALENT(S): at 12:12

## 2020-02-15 RX ADMIN — Medication 650 MILLIGRAM(S): at 19:00

## 2020-02-15 RX ADMIN — Medication 5 MILLIGRAM(S): at 21:07

## 2020-02-15 RX ADMIN — Medication 1 TABLET(S): at 05:14

## 2020-02-15 RX ADMIN — Medication 0.5 MILLIGRAM(S): at 21:07

## 2020-02-15 RX ADMIN — Medication 1000 MILLIGRAM(S): at 12:15

## 2020-02-15 RX ADMIN — Medication 1000 MILLIGRAM(S): at 00:13

## 2020-02-15 RX ADMIN — Medication 48 MILLIGRAM(S): at 12:15

## 2020-02-15 RX ADMIN — Medication 1000 MILLIGRAM(S): at 05:44

## 2020-02-15 NOTE — DISCHARGE NOTE PROVIDER - NSDCCPTREATMENT_GEN_ALL_CORE_FT
PRINCIPAL PROCEDURE  Procedure: Creation, revision, or reversal, loop colostomy or ileostomy  Findings and Treatment: reversal of diverting loop ileostomy      SECONDARY PROCEDURE  Procedure: Repair of parastomal hernia using mesh  Findings and Treatment:

## 2020-02-15 NOTE — PROGRESS NOTE ADULT - SUBJECTIVE AND OBJECTIVE BOX
SURGERY DAILY PROGRESS NOTE:     SUBJECTIVE/24hr Events:     Patient seen and examined on am rounds. Pt tolerating CLD post op. No acute events overnight. Feels well this am. Denies n/v, pain well controlled.      OBJECTIVE:    MEDICATIONS  (STANDING):  acetaminophen  IVPB .. 1000 milliGRAM(s) IV Intermittent every 6 hours  amLODIPine   Tablet 5 milliGRAM(s) Oral daily  enoxaparin Injectable 40 milliGRAM(s) SubCutaneous daily  fenofibrate Tablet 48 milliGRAM(s) Oral daily  lactated ringers. 1000 milliLiter(s) (40 mL/Hr) IV Continuous <Continuous>  triamterene 37.5 mG/hydrochlorothiazide 25 mG Tablet 1 Tablet(s) Oral daily    MEDICATIONS  (PRN):  oxyCODONE    IR 5 milliGRAM(s) Oral every 4 hours PRN Moderate Pain (4 - 6)      Vital Signs Last 24 Hrs  T(C): 36.6 (15 Feb 2020 02:25), Max: 36.7 (14 Feb 2020 12:02)  T(F): 97.9 (15 Feb 2020 02:25), Max: 98.1 (14 Feb 2020 12:02)  HR: 52 (15 Feb 2020 02:25) (52 - 85)  BP: 125/75 (15 Feb 2020 02:25) (113/85 - 153/74)  BP(mean): 96 (14 Feb 2020 22:00) (90 - 107)  RR: 18 (15 Feb 2020 02:25) (14 - 18)  SpO2: 100% (15 Feb 2020 02:25) (97% - 100%)      I&O's Detail    14 Feb 2020 07:01  -  15 Feb 2020 04:10  --------------------------------------------------------  IN:    lactated ringers.: 200 mL    Oral Fluid: 400 mL  Total IN: 600 mL    OUT:    Indwelling Catheter - Urethral: 350 mL  Total OUT: 350 mL    Total NET: 250 mL            Daily Height in cm: 158.75 (14 Feb 2020 12:02)    Daily         PHYSICAL EXAM:  Constitutional: well developed, well nourished, NAD  ENMT: normal facies, symmetric  Respiratory: Normal respiratory effort   Abdomen: Soft, appropriate incisional tenderness, minimally distended. Dressing c/d/i. SURGERY DAILY PROGRESS NOTE:     SUBJECTIVE/24hr Events:     Patient seen and examined on am rounds. Pt tolerating CLD post op. No acute events overnight. Feels well this am. Denies n/v, pain well controlled. No flatus or BM.      OBJECTIVE:    MEDICATIONS  (STANDING):  acetaminophen  IVPB .. 1000 milliGRAM(s) IV Intermittent every 6 hours  amLODIPine   Tablet 5 milliGRAM(s) Oral daily  enoxaparin Injectable 40 milliGRAM(s) SubCutaneous daily  fenofibrate Tablet 48 milliGRAM(s) Oral daily  lactated ringers. 1000 milliLiter(s) (40 mL/Hr) IV Continuous <Continuous>  triamterene 37.5 mG/hydrochlorothiazide 25 mG Tablet 1 Tablet(s) Oral daily    MEDICATIONS  (PRN):  oxyCODONE    IR 5 milliGRAM(s) Oral every 4 hours PRN Moderate Pain (4 - 6)      Vital Signs Last 24 Hrs  T(C): 36.6 (15 Feb 2020 02:25), Max: 36.7 (14 Feb 2020 12:02)  T(F): 97.9 (15 Feb 2020 02:25), Max: 98.1 (14 Feb 2020 12:02)  HR: 52 (15 Feb 2020 02:25) (52 - 85)  BP: 125/75 (15 Feb 2020 02:25) (113/85 - 153/74)  BP(mean): 96 (14 Feb 2020 22:00) (90 - 107)  RR: 18 (15 Feb 2020 02:25) (14 - 18)  SpO2: 100% (15 Feb 2020 02:25) (97% - 100%)      I&O's Detail    14 Feb 2020 07:01  -  15 Feb 2020 04:10  --------------------------------------------------------  IN:    lactated ringers.: 200 mL    Oral Fluid: 400 mL  Total IN: 600 mL    OUT:    Indwelling Catheter - Urethral: 350 mL  Total OUT: 350 mL    Total NET: 250 mL            Daily Height in cm: 158.75 (14 Feb 2020 12:02)    Daily         PHYSICAL EXAM:  Constitutional: well developed, well nourished, NAD  ENMT: normal facies, symmetric  Respiratory: Normal respiratory effort   Abdomen: Soft, appropriate incisional tenderness, minimally distended. Dressing c/d/i. SURGERY DAILY PROGRESS NOTE:     SUBJECTIVE/24hr Events:     Patient seen and examined on am rounds. Pt tolerating CLD post op. No acute events overnight. Feels well this am. Denies n/v, pain well controlled. Had flatus and BM.      OBJECTIVE:    MEDICATIONS  (STANDING):  acetaminophen  IVPB .. 1000 milliGRAM(s) IV Intermittent every 6 hours  amLODIPine   Tablet 5 milliGRAM(s) Oral daily  enoxaparin Injectable 40 milliGRAM(s) SubCutaneous daily  fenofibrate Tablet 48 milliGRAM(s) Oral daily  lactated ringers. 1000 milliLiter(s) (40 mL/Hr) IV Continuous <Continuous>  triamterene 37.5 mG/hydrochlorothiazide 25 mG Tablet 1 Tablet(s) Oral daily    MEDICATIONS  (PRN):  oxyCODONE    IR 5 milliGRAM(s) Oral every 4 hours PRN Moderate Pain (4 - 6)      Vital Signs Last 24 Hrs  T(C): 36.6 (15 Feb 2020 02:25), Max: 36.7 (14 Feb 2020 12:02)  T(F): 97.9 (15 Feb 2020 02:25), Max: 98.1 (14 Feb 2020 12:02)  HR: 52 (15 Feb 2020 02:25) (52 - 85)  BP: 125/75 (15 Feb 2020 02:25) (113/85 - 153/74)  BP(mean): 96 (14 Feb 2020 22:00) (90 - 107)  RR: 18 (15 Feb 2020 02:25) (14 - 18)  SpO2: 100% (15 Feb 2020 02:25) (97% - 100%)      I&O's Detail    14 Feb 2020 07:01  -  15 Feb 2020 04:10  --------------------------------------------------------  IN:    lactated ringers.: 200 mL    Oral Fluid: 400 mL  Total IN: 600 mL    OUT:    Indwelling Catheter - Urethral: 350 mL  Total OUT: 350 mL    Total NET: 250 mL            Daily Height in cm: 158.75 (14 Feb 2020 12:02)    Daily         PHYSICAL EXAM:  Constitutional: well developed, well nourished, NAD  ENMT: normal facies, symmetric  Respiratory: Normal respiratory effort   Abdomen: Soft, appropriate incisional tenderness, minimally distended. Dressing c/d/i.

## 2020-02-15 NOTE — CHART NOTE - NSCHARTNOTEFT_GEN_A_CORE
Anesthesia Pain Management Service    SUBJECTIVE:  Pain Scale Score:          [X] Refer to charted pain scores    THERAPY:    s/p ___mg PF morphine on ___      MEDICATIONS  (STANDING):  acetaminophen  IVPB .. 1000 milliGRAM(s) IV Intermittent every 6 hours  amLODIPine   Tablet 5 milliGRAM(s) Oral daily  enoxaparin Injectable 40 milliGRAM(s) SubCutaneous daily  fenofibrate Tablet 48 milliGRAM(s) Oral daily  lactated ringers. 1000 milliLiter(s) (40 mL/Hr) IV Continuous <Continuous>  potassium chloride    Tablet ER 40 milliEquivalent(s) Oral once  triamterene 37.5 mG/hydrochlorothiazide 25 mG Tablet 1 Tablet(s) Oral daily    MEDICATIONS  (PRN):  oxyCODONE    IR 5 milliGRAM(s) Oral every 4 hours PRN Moderate Pain (4 - 6)      OBJECTIVE:    Sedation:        	[X] Alert	[ ] Drowsy	[ ] Arousable      [ ] Asleep       [ ] Unresponsive    Side Effects:	[X] None	[ ] Nausea	[ ] Vomiting         [ ] Pruritus  		[ ] Weakness            [ ] Numbness	          [ ] Other:    Vital Signs Last 24 Hrs  T(C): 36.7 (15 Feb 2020 05:10), Max: 36.7 (14 Feb 2020 12:02)  T(F): 98 (15 Feb 2020 05:10), Max: 98.1 (14 Feb 2020 12:02)  HR: 56 (15 Feb 2020 05:10) (52 - 85)  BP: 128/76 (15 Feb 2020 05:10) (113/85 - 153/74)  BP(mean): 96 (14 Feb 2020 22:00) (90 - 107)  RR: 18 (15 Feb 2020 06:50) (14 - 18)  SpO2: 100% (15 Feb 2020 06:50) (97% - 100%)    ASSESSMENT/ PLAN  [X] Patient transitioned to prn analgesics  [X] Pain management per primary service, pain service to sign off   [X]Documentation and Verification of current medications

## 2020-02-15 NOTE — DISCHARGE NOTE PROVIDER - NSDCCPCAREPLAN_GEN_ALL_CORE_FT
PRINCIPAL DISCHARGE DIAGNOSIS  Diagnosis: Ileostomy status  Assessment and Plan of Treatment:       SECONDARY DISCHARGE DIAGNOSES  Diagnosis: Parastomal hernia  Assessment and Plan of Treatment:

## 2020-02-15 NOTE — DISCHARGE NOTE PROVIDER - CARE PROVIDER_API CALL
Lanre Navarro)  ColonRectal Surgery; Surgery  [address], [address]  [place] [address]  Phone: [phone]  Fax: [phone]  Follow Up Time: 1-3 days

## 2020-02-15 NOTE — DISCHARGE NOTE PROVIDER - HOSPITAL COURSE
60 yo female with HTN, presents for closure of loop ileostomy parastomal hernia repair on 2/14/2020 with Dr. Navarro. H/O colostomy secondary to a ruptured a diverticulitis from 6/1/2019. Was previously scheduled for 12/10/19 for closure, went to the OR, was found to be inflamed and subsequently ended up having an appendectomy, closure of the colostomy but creation of an ileostomy.  Patient underwent procedure as planned on 2/14, it was uncomplicated and well tolerated. Post operatively pt tolerating clear liquids and endorses flatus overnight. On POD1 advanced to low residue diet. 58 yo female with HTN, presents for closure of loop ileostomy parastomal hernia repair on 2/14/2020 with Dr. Navarro. H/O colostomy secondary to a ruptured a diverticulitis from 6/1/2019. Was previously scheduled for 12/10/19 for closure, went to the OR, was found to be inflamed and subsequently ended up having an appendectomy, closure of the colostomy but creation of an ileostomy.  Patient underwent procedure as planned on 2/14, it was uncomplicated and well tolerated. Post operatively pt tolerating clear liquids and endorses flatus overnight. On POD1 advanced to low residue diet. On day of discharge, the patient was tolerating diet, ambulating well and pain controlled.

## 2020-02-15 NOTE — PROGRESS NOTE ADULT - ASSESSMENT
60 yo female hx Carolyn's for perforated diverticulitis s/p colostomy reversal, creation of diverting loop ileostomy and appendectomy (12/10/19) now POD 1 s/p closure of loop ileostomy and parastomal hernia repair with strattice mesh 2/12/20.     - f/u am labs  - Pain control: APAP IV 1000mg q 6hr x 24hrs, Oxycodone prn  - c.w home meds   - MagOxide 1gm BID  - OOB/ambi/IS  - d/c SAMMY maier    [facility]   [id number] 60 yo female hx Carolyn's for perforated diverticulitis s/p colostomy reversal, creation of diverting loop ileostomy and appendectomy (12/10/19) now POD 1 s/p closure of loop ileostomy and parastomal hernia repair with strattice mesh 2/12/20.     - ERAS protocol   - f/u am labs  - Pain control: APAP IV 1000mg q 6hr x 24hrs, Oxycodone prn. No Toradol   - c.w home meds   - MagOxide 1gm BID  - OOB/ambi/IS  - d/c SAMMY maier    [facility]   [id number]

## 2020-02-15 NOTE — DISCHARGE NOTE PROVIDER - NSDCMRMEDTOKEN_GEN_ALL_CORE_FT
ALPRAZolam 0.5 mg oral tablet: 0.5 tab(s) orally once a day (at bedtime)  amLODIPine 5 mg oral tablet: 1 tab(s) orally once a day  biotin: orally once a day  cyclobenzaprine 10 mg oral tablet: 1 tab(s) orally 2 times a day, As Needed  fenofibrate 48 mg oral tablet: 1 tab(s) orally once a day  Melatonin 5 mg oral tablet: 1 tab(s) orally once a day (at bedtime)  oxyCODONE-acetaminophen 10 mg-325 mg oral tablet: 1 tab(s) orally every 6 hours, As Needed  triamterene-hydrochlorothiazide 37.5 mg-25 mg oral capsule: 1 cap(s) orally once a day ALPRAZolam 0.5 mg oral tablet: 0.5 tab(s) orally once a day (at bedtime)  amLODIPine 5 mg oral tablet: 1 tab(s) orally once a day  biotin: orally once a day  cyclobenzaprine 10 mg oral tablet: 1 tab(s) orally 2 times a day, As Needed  fenofibrate 48 mg oral tablet: 1 tab(s) orally once a day  Melatonin 5 mg oral tablet: 1 tab(s) orally once a day (at bedtime)  oxyCODONE 5 mg oral tablet: 1 tab(s) orally every 6 hours MDD:4 tabs  oxyCODONE-acetaminophen 10 mg-325 mg oral tablet: 1 tab(s) orally every 6 hours, As Needed  triamterene-hydrochlorothiazide 37.5 mg-25 mg oral capsule: 1 cap(s) orally once a day

## 2020-02-15 NOTE — DISCHARGE NOTE PROVIDER - NSDCFUADDINST_GEN_ALL_CORE_FT
PAIN: You may continue to take Acetaminophen (Tylenol) over the counter for pain.     WOUND CARE:  Penrose drain will remain in place on discharge. It will be removed at your follow up appointment with Dr. Navarro. You may allow warm soapy water to run down the wound in the shower. You do not need to scrub the area. You do not have any stitches that need to be removed. Allow steri strips to fall off on their own.     BATHING: Please do not soak or submerge the wound in water (bath, swimming) for 14 days after your surgery.    ACTIVITY: No heavy lifting, straining, or vigorous activity until your follow-up appointment in 2 weeks.    NOTIFY US IF: You have any bleeding that does not stop, any pus draining from wound, fever (over 100.4 F), persistent nausea/vomiting, persistent diarrhea, or if pain is not controlled on discharge pain medications.    FOLLOW-UP: Please call the office on date of discharge and make an appointment to follow up with Dr. Navarro PAIN: You may continue to take Acetaminophen (Tylenol) over the counter for pain.     WOUND CARE:  Penrose drain will remain in place on discharge. It will be removed at your follow up appointment with Dr. Navarro. You may allow warm soapy water to run down the wound in the shower. You do not need to scrub the area. You do not have any stitches that need to be removed. Allow steri strips to fall off on their own.     BATHING: Please do not soak or submerge the wound in water (bath, swimming) for 14 days after your surgery.    ACTIVITY: No heavy lifting, straining, or vigorous activity until your follow-up appointment in 2 weeks.    NOTIFY US IF: You have any bleeding that does not stop, any pus draining from wound, fever (over 100.4 F), persistent nausea/vomiting, persistent diarrhea, or if pain is not controlled on discharge pain medications.    FOLLOW-UP: Please call the office on date of discharge and make an appointment to follow up with Dr. Navarro    Please make an appointment with Dr. Navarro for this Wednesday 2/19/2020 PAIN: You may continue to take Acetaminophen (Tylenol) over the counter for pain.     WOUND CARE:  Penrose drain will remain in place on discharge. It will be removed at your follow up appointment with Dr. Navarro. You may allow warm soapy water to run down the wound in the shower. You do not need to scrub the area. You do not have any stitches that need to be removed. Allow steri strips to fall off on their own.     BATHING: Please do not soak or submerge the wound in water (bath, swimming) for 14 days after your surgery.    ACTIVITY: No heavy lifting, straining, or vigorous activity until your follow-up appointment in 2 weeks.    NOTIFY US IF: You have any bleeding that does not stop, any pus draining from wound, fever (over 100.4 F), persistent nausea/vomiting, persistent diarrhea, or if pain is not controlled on discharge pain medications.    FOLLOW-UP: Please call the office on date of discharge and make an appointment to follow up with Dr. Navarro    Please make an appointment with Dr. Navarro for this Wednesday 2/19/2020    Please place one guaze pad over pen jose m drain daily

## 2020-02-16 ENCOUNTER — TRANSCRIPTION ENCOUNTER (OUTPATIENT)
Age: 60
End: 2020-02-16

## 2020-02-16 VITALS
HEART RATE: 70 BPM | RESPIRATION RATE: 18 BRPM | OXYGEN SATURATION: 98 % | DIASTOLIC BLOOD PRESSURE: 80 MMHG | SYSTOLIC BLOOD PRESSURE: 135 MMHG | TEMPERATURE: 98 F

## 2020-02-16 LAB
ANION GAP SERPL CALC-SCNC: 13 MMOL/L — SIGNIFICANT CHANGE UP (ref 5–17)
BUN SERPL-MCNC: 21 MG/DL — SIGNIFICANT CHANGE UP (ref 7–23)
CALCIUM SERPL-MCNC: 8.8 MG/DL — SIGNIFICANT CHANGE UP (ref 8.4–10.5)
CHLORIDE SERPL-SCNC: 101 MMOL/L — SIGNIFICANT CHANGE UP (ref 96–108)
CO2 SERPL-SCNC: 23 MMOL/L — SIGNIFICANT CHANGE UP (ref 22–31)
CREAT SERPL-MCNC: 0.89 MG/DL — SIGNIFICANT CHANGE UP (ref 0.5–1.3)
GLUCOSE SERPL-MCNC: 108 MG/DL — HIGH (ref 70–99)
HCT VFR BLD CALC: 36.9 % — SIGNIFICANT CHANGE UP (ref 34.5–45)
HGB BLD-MCNC: 12.1 G/DL — SIGNIFICANT CHANGE UP (ref 11.5–15.5)
MAGNESIUM SERPL-MCNC: 2 MG/DL — SIGNIFICANT CHANGE UP (ref 1.6–2.6)
MCHC RBC-ENTMCNC: 27.9 PG — SIGNIFICANT CHANGE UP (ref 27–34)
MCHC RBC-ENTMCNC: 32.8 GM/DL — SIGNIFICANT CHANGE UP (ref 32–36)
MCV RBC AUTO: 85.2 FL — SIGNIFICANT CHANGE UP (ref 80–100)
NRBC # BLD: 0 /100 WBCS — SIGNIFICANT CHANGE UP (ref 0–0)
PHOSPHATE SERPL-MCNC: 2.4 MG/DL — LOW (ref 2.5–4.5)
PLATELET # BLD AUTO: 212 K/UL — SIGNIFICANT CHANGE UP (ref 150–400)
POTASSIUM SERPL-MCNC: 3.4 MMOL/L — LOW (ref 3.5–5.3)
POTASSIUM SERPL-SCNC: 3.4 MMOL/L — LOW (ref 3.5–5.3)
RBC # BLD: 4.33 M/UL — SIGNIFICANT CHANGE UP (ref 3.8–5.2)
RBC # FLD: 14.9 % — HIGH (ref 10.3–14.5)
SODIUM SERPL-SCNC: 137 MMOL/L — SIGNIFICANT CHANGE UP (ref 135–145)
WBC # BLD: 8.17 K/UL — SIGNIFICANT CHANGE UP (ref 3.8–10.5)
WBC # FLD AUTO: 8.17 K/UL — SIGNIFICANT CHANGE UP (ref 3.8–10.5)

## 2020-02-16 PROCEDURE — 99261: CPT

## 2020-02-16 PROCEDURE — 84100 ASSAY OF PHOSPHORUS: CPT

## 2020-02-16 PROCEDURE — C1889: CPT

## 2020-02-16 PROCEDURE — 80048 BASIC METABOLIC PNL TOTAL CA: CPT

## 2020-02-16 PROCEDURE — 82962 GLUCOSE BLOOD TEST: CPT

## 2020-02-16 PROCEDURE — 85027 COMPLETE CBC AUTOMATED: CPT

## 2020-02-16 PROCEDURE — 88304 TISSUE EXAM BY PATHOLOGIST: CPT

## 2020-02-16 PROCEDURE — 83735 ASSAY OF MAGNESIUM: CPT

## 2020-02-16 RX ORDER — SODIUM,POTASSIUM PHOSPHATES 278-250MG
2 POWDER IN PACKET (EA) ORAL ONCE
Refills: 0 | Status: COMPLETED | OUTPATIENT
Start: 2020-02-16 | End: 2020-02-16

## 2020-02-16 RX ORDER — OXYCODONE HYDROCHLORIDE 5 MG/1
1 TABLET ORAL
Qty: 12 | Refills: 0
Start: 2020-02-16 | End: 2020-02-18

## 2020-02-16 RX ADMIN — Medication 2 PACKET(S): at 11:39

## 2020-02-16 RX ADMIN — OXYCODONE HYDROCHLORIDE 5 MILLIGRAM(S): 5 TABLET ORAL at 07:46

## 2020-02-16 RX ADMIN — AMLODIPINE BESYLATE 5 MILLIGRAM(S): 2.5 TABLET ORAL at 05:47

## 2020-02-16 RX ADMIN — ENOXAPARIN SODIUM 40 MILLIGRAM(S): 100 INJECTION SUBCUTANEOUS at 11:40

## 2020-02-16 RX ADMIN — Medication 1 TABLET(S): at 05:47

## 2020-02-16 RX ADMIN — OXYCODONE HYDROCHLORIDE 5 MILLIGRAM(S): 5 TABLET ORAL at 08:16

## 2020-02-16 RX ADMIN — Medication 48 MILLIGRAM(S): at 11:40

## 2020-02-16 NOTE — PROGRESS NOTE ADULT - ATTENDING COMMENTS
I have seen and evaluated patient and agree with resident assessment and plan.  Discussed with team
I have seen and evaluated patient and discussed with team. Agree with resident assessment and plan.  Continue ERAS

## 2020-02-16 NOTE — PROGRESS NOTE ADULT - ASSESSMENT
58 yo female hx Carolyn's for perforated diverticulitis s/p colostomy reversal, creation of diverting loop ileostomy and appendectomy (12/10/19) now POD 1 s/p closure of loop ileostomy and parastomal hernia repair with strattice mesh 2/12/20.     - ERAS protocol   - f/u am labs  - Pain control: No Toradol   - c.w home meds   - OOB/ambi/IS  - Plan for dc home with penrose in place     [facility]   [id number]

## 2020-02-16 NOTE — PROGRESS NOTE ADULT - SUBJECTIVE AND OBJECTIVE BOX
SURGERY DAILY PROGRESS NOTE:     SUBJECTIVE/24hr Events:     Patient seen and examined on am rounds. Yesterday advanced to LRD, tolerating. Behzad salgado'maddie, passed TOV. No acute events overnight. This am pain well controlled, denies n/v.   Ambulating and voiding adequately. Endorses flatus, no BM.      OBJECTIVE:    MEDICATIONS  (STANDING):  ALPRAZolam 0.5 milliGRAM(s) Oral at bedtime  amLODIPine   Tablet 5 milliGRAM(s) Oral daily  enoxaparin Injectable 40 milliGRAM(s) SubCutaneous daily  fenofibrate Tablet 48 milliGRAM(s) Oral daily  melatonin 5 milliGRAM(s) Oral at bedtime  triamterene 37.5 mG/hydrochlorothiazide 25 mG Tablet 1 Tablet(s) Oral daily    MEDICATIONS  (PRN):  acetaminophen   Tablet .. 650 milliGRAM(s) Oral every 6 hours PRN Mild Pain (1 - 3)  oxyCODONE    IR 5 milliGRAM(s) Oral every 4 hours PRN Moderate Pain (4 - 6)      Vital Signs Last 24 Hrs  T(C): 36.8 (16 Feb 2020 00:56), Max: 36.8 (16 Feb 2020 00:56)  T(F): 98.2 (16 Feb 2020 00:56), Max: 98.2 (16 Feb 2020 00:56)  HR: 66 (16 Feb 2020 00:56) (52 - 66)  BP: 114/72 (16 Feb 2020 00:56) (114/72 - 137/79)  BP(mean): --  RR: 18 (16 Feb 2020 00:56) (18 - 18)  SpO2: 97% (16 Feb 2020 00:56) (94% - 100%)      I&O's Detail    14 Feb 2020 07:01  -  15 Feb 2020 07:00  --------------------------------------------------------  IN:    lactated ringers.: 520 mL    Oral Fluid: 400 mL    Solution: 200 mL  Total IN: 1120 mL    OUT:    Indwelling Catheter - Urethral: 950 mL  Total OUT: 950 mL    Total NET: 170 mL      15 Feb 2020 07:01  -  16 Feb 2020 02:09  --------------------------------------------------------  IN:    Oral Fluid: 1100 mL    Solution: 100 mL  Total IN: 1200 mL    OUT:    Indwelling Catheter - Urethral: 500 mL    Voided: 410 mL  Total OUT: 910 mL    Total NET: 290 mL            LABS:                        12.0   11.18 )-----------( 227      ( 15 Feb 2020 07:17 )             38.3     02-15    137  |  100  |  21  ----------------------------<  113<H>  3.3<L>   |  23  |  0.89    Ca    8.6      15 Feb 2020 07:15  Phos  3.4     02-15  Mg     2.2     02-15          PHYSICAL EXAM:  Constitutional: well developed, well nourished, NAD  ENMT: normal facies, symmetric  Respiratory: Normal respiratory effort   Abdomen: Soft, appropriate incisional tenderness, ND. No rebound or guarding. Penrose drain at incision site. Dressing w/ minimal drainage. SURGERY DAILY PROGRESS NOTE:     SUBJECTIVE/24hr Events:     Patient seen and examined on am rounds. Yesterday advanced to LRD, tolerating. Behzad rae, passed TOV. No acute events overnight. This am pain well controlled, denies n/v.   Ambulating. Endorses flatus and BM.       OBJECTIVE:    MEDICATIONS  (STANDING):  ALPRAZolam 0.5 milliGRAM(s) Oral at bedtime  amLODIPine   Tablet 5 milliGRAM(s) Oral daily  enoxaparin Injectable 40 milliGRAM(s) SubCutaneous daily  fenofibrate Tablet 48 milliGRAM(s) Oral daily  melatonin 5 milliGRAM(s) Oral at bedtime  triamterene 37.5 mG/hydrochlorothiazide 25 mG Tablet 1 Tablet(s) Oral daily    MEDICATIONS  (PRN):  acetaminophen   Tablet .. 650 milliGRAM(s) Oral every 6 hours PRN Mild Pain (1 - 3)  oxyCODONE    IR 5 milliGRAM(s) Oral every 4 hours PRN Moderate Pain (4 - 6)      Vital Signs Last 24 Hrs  T(C): 36.8 (16 Feb 2020 00:56), Max: 36.8 (16 Feb 2020 00:56)  T(F): 98.2 (16 Feb 2020 00:56), Max: 98.2 (16 Feb 2020 00:56)  HR: 66 (16 Feb 2020 00:56) (52 - 66)  BP: 114/72 (16 Feb 2020 00:56) (114/72 - 137/79)  BP(mean): --  RR: 18 (16 Feb 2020 00:56) (18 - 18)  SpO2: 97% (16 Feb 2020 00:56) (94% - 100%)      I&O's Detail    14 Feb 2020 07:01  -  15 Feb 2020 07:00  --------------------------------------------------------  IN:    lactated ringers.: 520 mL    Oral Fluid: 400 mL    Solution: 200 mL  Total IN: 1120 mL    OUT:    Indwelling Catheter - Urethral: 950 mL  Total OUT: 950 mL    Total NET: 170 mL      15 Feb 2020 07:01  -  16 Feb 2020 02:09  --------------------------------------------------------  IN:    Oral Fluid: 1100 mL    Solution: 100 mL  Total IN: 1200 mL    OUT:    Indwelling Catheter - Urethral: 500 mL    Voided: 410 mL  Total OUT: 910 mL    Total NET: 290 mL            LABS:                        12.0   11.18 )-----------( 227      ( 15 Feb 2020 07:17 )             38.3     02-15    137  |  100  |  21  ----------------------------<  113<H>  3.3<L>   |  23  |  0.89    Ca    8.6      15 Feb 2020 07:15  Phos  3.4     02-15  Mg     2.2     02-15          PHYSICAL EXAM:  Constitutional: well developed, well nourished, NAD  ENMT: normal facies, symmetric  Respiratory: Normal respiratory effort   Abdomen: Soft, appropriate incisional tenderness, ND. No rebound or guarding. Penrose drain at incision site. Dressing w/ minimal drainage.

## 2020-02-16 NOTE — DISCHARGE NOTE NURSING/CASE MANAGEMENT/SOCIAL WORK - PATIENT PORTAL LINK FT
You can access the FollowMyHealth Patient Portal offered by [facility] by registering at the following website: [email or web address]. By joining [facility]’s FollowMyHealth portal, you will also be able to view your health information using other applications (apps) compatible with our system.

## 2020-02-18 PROBLEM — Z93.2 ILEOSTOMY IN PLACE: Status: RESOLVED | Noted: 2019-12-16 | Resolved: 2020-02-18

## 2020-02-18 PROBLEM — K43.5 PARASTOMAL HERNIA WITHOUT OBSTRUCTION OR GANGRENE: Chronic | Status: ACTIVE | Noted: 2020-02-04

## 2020-02-18 PROBLEM — K57.90 DIVERTICULOSIS OF INTESTINE, PART UNSPECIFIED, WITHOUT PERFORATION OR ABSCESS WITHOUT BLEEDING: Chronic | Status: ACTIVE | Noted: 2020-02-04

## 2020-02-18 PROBLEM — Z93.2 ILEOSTOMY STATUS: Chronic | Status: ACTIVE | Noted: 2020-02-04

## 2020-02-19 ENCOUNTER — APPOINTMENT (OUTPATIENT)
Dept: SURGERY | Facility: CLINIC | Age: 60
End: 2020-02-19
Payer: COMMERCIAL

## 2020-02-19 VITALS
WEIGHT: 129 LBS | RESPIRATION RATE: 17 BRPM | OXYGEN SATURATION: 95 % | SYSTOLIC BLOOD PRESSURE: 149 MMHG | DIASTOLIC BLOOD PRESSURE: 82 MMHG | HEART RATE: 79 BPM | HEIGHT: 62 IN | BODY MASS INDEX: 23.74 KG/M2 | TEMPERATURE: 98.7 F

## 2020-02-19 DIAGNOSIS — Z93.2 ILEOSTOMY STATUS: ICD-10-CM

## 2020-02-19 DIAGNOSIS — Z09 ENCOUNTER FOR FOLLOW-UP EXAMINATION AFTER COMPLETED TREATMENT FOR CONDITIONS OTHER THAN MALIGNANT NEOPLASM: ICD-10-CM

## 2020-02-19 PROCEDURE — 99024 POSTOP FOLLOW-UP VISIT: CPT

## 2020-02-19 RX ORDER — METRONIDAZOLE 250 MG/1
250 TABLET ORAL
Qty: 3 | Refills: 0 | Status: DISCONTINUED | COMMUNITY
Start: 2020-02-10 | End: 2020-02-19

## 2020-02-19 RX ORDER — CIPROFLOXACIN HYDROCHLORIDE 250 MG/1
250 TABLET, FILM COATED ORAL
Qty: 3 | Refills: 0 | Status: DISCONTINUED | COMMUNITY
Start: 2020-02-10 | End: 2020-02-19

## 2020-02-19 NOTE — HISTORY OF PRESENT ILLNESS
[FreeTextEntry1] : Marcie is a 60 y/o female here for post-operative visit. 2/14/20- Resection and closure of loop ileostomy. Repair of incisional hernia with Strattice acellular xenograft. Penrose in place. \par Feeling well. Tolerating LRD. 3-4 loose BMs daily. Stomach pains from advil/tylenol (whole life). Takes oxycodone BID. Appetite/energy improving slowly. \par \par S/P Carolyn 5/30/19 (and RTOR 6/19/19 for SBO) while on vacation then Carolyn closure with loop ileostomy 12/10/19

## 2020-02-27 LAB — SURGICAL PATHOLOGY STUDY: SIGNIFICANT CHANGE UP

## 2020-03-02 ENCOUNTER — RESULT REVIEW (OUTPATIENT)
Age: 60
End: 2020-03-02

## 2020-07-01 ENCOUNTER — APPOINTMENT (OUTPATIENT)
Dept: SURGERY | Facility: CLINIC | Age: 60
End: 2020-07-01

## 2020-08-19 ENCOUNTER — APPOINTMENT (OUTPATIENT)
Dept: SURGERY | Facility: CLINIC | Age: 60
End: 2020-08-19
Payer: COMMERCIAL

## 2020-08-19 ENCOUNTER — TRANSCRIPTION ENCOUNTER (OUTPATIENT)
Age: 60
End: 2020-08-19

## 2020-08-19 VITALS
RESPIRATION RATE: 17 BRPM | HEART RATE: 76 BPM | DIASTOLIC BLOOD PRESSURE: 82 MMHG | TEMPERATURE: 97.4 F | SYSTOLIC BLOOD PRESSURE: 150 MMHG

## 2020-08-19 DIAGNOSIS — K57.30 DIVERTICULOSIS OF LARGE INTESTINE W/OUT PERFORATION OR ABSCESS W/OUT BLEEDING: ICD-10-CM

## 2020-08-19 PROCEDURE — 99213 OFFICE O/P EST LOW 20 MIN: CPT

## 2020-08-19 RX ORDER — OXYCODONE HYDROCHLORIDE 30 MG/1
TABLET ORAL
Refills: 0 | Status: DISCONTINUED | COMMUNITY
End: 2020-08-19

## 2020-08-19 NOTE — HISTORY OF PRESENT ILLNESS
[FreeTextEntry1] : Marcie is a 60 y/o female here for a follow up visit. S/P 2/14/20- Resection and closure of loop ileostomy. Repair of incisional hernia with Strattice acellular xenograft. \par \par Hx: S/P Carolyn 5/30/19 (and RTOR 6/19/19 for SBO) while on vacation then Carolyn closure with loop ileostomy 12/10/19 \par \par Patient has 2 normal formed BMs daily. Denies BRBPR. Tolerating a high fiber diet. Denies nausea/vomiting. Denies abdominal pain. \par

## 2020-08-19 NOTE — PHYSICAL EXAM
[Abdomen Masses] : No abdominal masses [No HSM] : no hepatosplenomegaly [Abdomen Tenderness] : ~T No ~M abdominal tenderness

## 2020-08-19 NOTE — ASSESSMENT
[FreeTextEntry1] : Patient doing well.  Has normal bowel function.  Follow-up colonoscopy 5 years from the complete exam in October.  Patient to maintain a regular diet.

## 2020-12-21 PROBLEM — Z87.440 HISTORY OF URINARY TRACT INFECTION: Status: RESOLVED | Noted: 2019-12-02 | Resolved: 2020-12-21

## 2021-02-04 NOTE — ED ADULT TRIAGE NOTE - CHIEF COMPLAINT QUOTE
abd/back pain  pt in hospital in [place] for a month with sbo  pt was flown in by [facility] [employer]
yes

## 2021-10-24 NOTE — PHYSICAL THERAPY INITIAL EVALUATION ADULT - GENERAL OBSERVATIONS, REHAB EVAL
Pt received supine in bed, +IVL, +NGT, +maier, +ileostomy, +PCEA, A&OX4, follows all commands, +talkative and can get anxious at times Female

## 2022-03-24 NOTE — PROGRESS NOTE ADULT - ASSESSMENT
Detail Level: Detailed Add 20978 Cpt? (Important Note: In 2017 The Use Of 63391 Is Being Tracked By Cms To Determine Future Global Period Reimbursement For Global Periods): no 59y Female s/p Carolyn reversal extensive FRIDA, appendectomy, and creation of loop ileostomy 12/10.      Plan:  - d/c PCEA, start oral pain meds  - LRD  - DVT ppx  - OOB and ambulating as tolerated  - F/u AM labs.  - PT, ostomy team  - dispo home with no PT, requesting VNS    [id number] 59y Female s/p Carolyn reversal extensive FRIDA, appendectomy, and creation of loop ileostomy 12/10.      Plan:  - d/c PCEA, start oral pain meds  - LRD  - DVT ppx  - OOB and ambulating as tolerated  - F/u AM labs.  - f/u bcx  - PT, ostomy team  - dispo home with no PT, requesting VNS    [id number]

## 2022-08-29 NOTE — DISCHARGE NOTE NURSING/CASE MANAGEMENT/SOCIAL WORK - NSPROEXTENSIONSOFSELF_GEN_A_NUR
eyeglasses Xelanithaz Pregnancy And Lactation Text: This medication is Pregnancy Category D and is not considered safe during pregnancy.  The risk during breast feeding is also uncertain.

## 2022-09-14 NOTE — ED ADULT NURSE NOTE - CAS EDN DISCHARGE ASSESSMENT
Provide a safe, barrier-free environment that encourages independent activity.  Keep care area uncluttered and well-lighted.  Determine need for increased observation or monitoring.  Avoid use of devices that minimize mobility, such as restraints or indwelling urinary catheter.  
Alert and oriented to person, place and time

## 2022-11-01 ENCOUNTER — OUTPATIENT (OUTPATIENT)
Dept: OUTPATIENT SERVICES | Facility: HOSPITAL | Age: 62
LOS: 1 days | End: 2022-11-01
Payer: COMMERCIAL

## 2022-11-01 ENCOUNTER — APPOINTMENT (OUTPATIENT)
Dept: MAMMOGRAPHY | Facility: IMAGING CENTER | Age: 62
End: 2022-11-01

## 2022-11-01 ENCOUNTER — APPOINTMENT (OUTPATIENT)
Dept: ULTRASOUND IMAGING | Facility: IMAGING CENTER | Age: 62
End: 2022-11-01

## 2022-11-01 DIAGNOSIS — Z98.1 ARTHRODESIS STATUS: Chronic | ICD-10-CM

## 2022-11-01 DIAGNOSIS — Z98.890 OTHER SPECIFIED POSTPROCEDURAL STATES: Chronic | ICD-10-CM

## 2022-11-01 DIAGNOSIS — Z00.8 ENCOUNTER FOR OTHER GENERAL EXAMINATION: ICD-10-CM

## 2022-11-01 DIAGNOSIS — Z93.3 COLOSTOMY STATUS: Chronic | ICD-10-CM

## 2022-11-01 PROCEDURE — 76641 ULTRASOUND BREAST COMPLETE: CPT

## 2022-11-01 PROCEDURE — 76641 ULTRASOUND BREAST COMPLETE: CPT | Mod: 26,LT

## 2022-11-01 PROCEDURE — G0279: CPT

## 2022-11-01 PROCEDURE — 77065 DX MAMMO INCL CAD UNI: CPT

## 2022-11-02 PROCEDURE — G0279: CPT | Mod: 26

## 2022-11-02 PROCEDURE — 77065 DX MAMMO INCL CAD UNI: CPT | Mod: 26,LT

## 2022-11-10 ENCOUNTER — APPOINTMENT (OUTPATIENT)
Dept: ULTRASOUND IMAGING | Facility: IMAGING CENTER | Age: 62
End: 2022-11-10

## 2022-11-10 ENCOUNTER — APPOINTMENT (OUTPATIENT)
Dept: MAMMOGRAPHY | Facility: IMAGING CENTER | Age: 62
End: 2022-11-10

## 2022-11-17 NOTE — PATIENT PROFILE ADULT - PRIMARY SOURCE OF SUPPORT/COMFORT
"Elsa Hernandez (ERICA)aaron was seen and treated in our emergency department on 11/17/2022.  She may return to work on 11/18/2022.       If you have any questions or concerns, please don't hesitate to call.      NICKOLAS Wright"
spouse/child(joaquín)

## 2025-03-06 NOTE — PATIENT PROFILE ADULT - NSPROMEDSADMININFO_GEN_A_NUR
Called pt and discussed -- she is requesting immediate action and is aware no message was noted in chart from 3/5/25. No message noted in chart of pt calling yesterday. Discussed w Dr Tam -- he is recommending Phenergan DM to be taken QID PRN for cough and congestion. Routed at this time.    no concerns